# Patient Record
Sex: MALE | Race: WHITE | Employment: OTHER | URBAN - METROPOLITAN AREA
[De-identification: names, ages, dates, MRNs, and addresses within clinical notes are randomized per-mention and may not be internally consistent; named-entity substitution may affect disease eponyms.]

---

## 2024-03-18 ENCOUNTER — HOSPITAL ENCOUNTER (INPATIENT)
Facility: HOSPITAL | Age: 79
LOS: 3 days | Discharge: HOME | End: 2024-03-22
Attending: STUDENT IN AN ORGANIZED HEALTH CARE EDUCATION/TRAINING PROGRAM | Admitting: GENERAL ACUTE CARE HOSPITAL
Payer: MEDICARE

## 2024-03-18 ENCOUNTER — APPOINTMENT (OUTPATIENT)
Facility: HOSPITAL | Age: 79
End: 2024-03-18
Payer: MEDICARE

## 2024-03-18 DIAGNOSIS — R55 SYNCOPE AND COLLAPSE: Primary | ICD-10-CM

## 2024-03-18 DIAGNOSIS — E87.5 HYPERKALEMIA: ICD-10-CM

## 2024-03-18 DIAGNOSIS — D64.9 ANEMIA, UNSPECIFIED TYPE: ICD-10-CM

## 2024-03-18 DIAGNOSIS — R90.89 ABNORMAL BRAIN CT: ICD-10-CM

## 2024-03-18 DIAGNOSIS — R73.9 HYPERGLYCEMIA: ICD-10-CM

## 2024-03-18 LAB
ALBUMIN SERPL-MCNC: 2.7 G/DL (ref 3.5–5)
ALBUMIN/GLOB SERPL: 0.8 (ref 1.1–2.2)
ALP SERPL-CCNC: 422 U/L (ref 45–117)
ALT SERPL-CCNC: 80 U/L (ref 12–78)
ANION GAP BLD CALC-SCNC: 7.5 MMOL/L (ref 10–20)
ANION GAP SERPL CALC-SCNC: 6 MMOL/L (ref 5–15)
APPEARANCE UR: CLEAR
APTT PPP: 24.5 SEC (ref 22.1–31)
AST SERPL-CCNC: 49 U/L (ref 15–37)
BACTERIA URNS QL MICRO: NEGATIVE /HPF
BASE DEFICIT BLDV-SCNC: 6.5 MMOL/L
BASOPHILS # BLD: 0 K/UL (ref 0–0.1)
BASOPHILS NFR BLD: 0 % (ref 0–1)
BILIRUB SERPL-MCNC: 1.2 MG/DL (ref 0.2–1)
BILIRUB UR QL: NEGATIVE
BUN SERPL-MCNC: 33 MG/DL (ref 6–20)
BUN/CREAT SERPL: 21 (ref 12–20)
CA-I BLD-MCNC: 1.31 MMOL/L (ref 1.12–1.32)
CALCIUM SERPL-MCNC: 8.5 MG/DL (ref 8.5–10.1)
CHLORIDE BLD-SCNC: 108 MMOL/L (ref 98–107)
CHLORIDE SERPL-SCNC: 105 MMOL/L (ref 97–108)
CO2 BLD-SCNC: 18.5 MMOL/L (ref 21–32)
CO2 SERPL-SCNC: 20 MMOL/L (ref 21–32)
COLOR UR: ABNORMAL
CREAT BLD-MCNC: 1.01 MG/DL (ref 0.6–1.3)
CREAT SERPL-MCNC: 1.54 MG/DL (ref 0.7–1.3)
DIFFERENTIAL METHOD BLD: ABNORMAL
EOSINOPHIL # BLD: 0 K/UL (ref 0–0.4)
EOSINOPHIL NFR BLD: 0 % (ref 0–7)
EPITH CASTS URNS QL MICRO: ABNORMAL /LPF
ERYTHROCYTE [DISTWIDTH] IN BLOOD BY AUTOMATED COUNT: 16 % (ref 11.5–14.5)
GLOBULIN SER CALC-MCNC: 3.6 G/DL (ref 2–4)
GLUCOSE BLD STRIP.AUTO-MCNC: 334 MG/DL (ref 65–117)
GLUCOSE BLD STRIP.AUTO-MCNC: 575 MG/DL (ref 65–117)
GLUCOSE BLD STRIP.AUTO-MCNC: 586 MG/DL (ref 65–117)
GLUCOSE BLD STRIP.AUTO-MCNC: 599 MG/DL (ref 65–117)
GLUCOSE BLD-MCNC: 446 MG/DL (ref 65–100)
GLUCOSE SERPL-MCNC: 563 MG/DL (ref 65–100)
GLUCOSE UR STRIP.AUTO-MCNC: >1000 MG/DL
HCO3 BLDV-SCNC: 18.3 MMOL/L (ref 23–28)
HCT VFR BLD AUTO: 24.2 % (ref 36.6–50.3)
HGB BLD-MCNC: 7.3 G/DL (ref 12.1–17)
HGB UR QL STRIP: NEGATIVE
HYALINE CASTS URNS QL MICRO: ABNORMAL /LPF (ref 0–2)
IMM GRANULOCYTES # BLD AUTO: 0 K/UL (ref 0–0.04)
IMM GRANULOCYTES NFR BLD AUTO: 0 % (ref 0–0.5)
INR PPP: 1.1 (ref 0.9–1.1)
KETONES UR QL STRIP.AUTO: NEGATIVE MG/DL
LEUKOCYTE ESTERASE UR QL STRIP.AUTO: NEGATIVE
LYMPHOCYTES # BLD: 0.4 K/UL (ref 0.8–3.5)
LYMPHOCYTES NFR BLD: 3 % (ref 12–49)
MCH RBC QN AUTO: 30.7 PG (ref 26–34)
MCHC RBC AUTO-ENTMCNC: 30.2 G/DL (ref 30–36.5)
MCV RBC AUTO: 101.7 FL (ref 80–99)
MONOCYTES # BLD: 0.4 K/UL (ref 0–1)
MONOCYTES NFR BLD: 3 % (ref 5–13)
NEUTS BAND NFR BLD MANUAL: 1 %
NEUTS SEG # BLD: 11 K/UL (ref 1.8–8)
NEUTS SEG NFR BLD: 93 % (ref 32–75)
NITRITE UR QL STRIP.AUTO: NEGATIVE
NRBC # BLD: 0 K/UL (ref 0–0.01)
NRBC BLD-RTO: 0 PER 100 WBC
PCO2 BLDV: 32.9 MMHG (ref 41–51)
PH BLDV: 7.35 (ref 7.32–7.42)
PH UR STRIP: 6.5 (ref 5–8)
PLATELET # BLD AUTO: 255 K/UL (ref 150–400)
PMV BLD AUTO: 10.9 FL (ref 8.9–12.9)
PO2 BLDV: 35 MMHG (ref 25–40)
POTASSIUM BLD-SCNC: 5 MMOL/L (ref 3.5–5.1)
POTASSIUM SERPL-SCNC: 6.2 MMOL/L (ref 3.5–5.1)
PROT SERPL-MCNC: 6.3 G/DL (ref 6.4–8.2)
PROT UR STRIP-MCNC: NEGATIVE MG/DL
PROTHROMBIN TIME: 11 SEC (ref 9–11.1)
RBC # BLD AUTO: 2.38 M/UL (ref 4.1–5.7)
RBC #/AREA URNS HPF: ABNORMAL /HPF (ref 0–5)
RBC MORPH BLD: ABNORMAL
SAO2 % BLDV: 64.7 % (ref 65–88)
SERVICE CMNT-IMP: ABNORMAL
SODIUM BLD-SCNC: 134 MMOL/L (ref 136–145)
SODIUM SERPL-SCNC: 131 MMOL/L (ref 136–145)
SP GR UR REFRACTOMETRY: 1.02
SPECIMEN TYPE: ABNORMAL
THERAPEUTIC RANGE: NORMAL SECS (ref 58–77)
TROPONIN I SERPL HS-MCNC: 10 NG/L (ref 0–76)
TROPONIN I SERPL HS-MCNC: 10 NG/L (ref 0–76)
URINE CULTURE IF INDICATED: ABNORMAL
UROBILINOGEN UR QL STRIP.AUTO: 0.2 EU/DL (ref 0.2–1)
WBC # BLD AUTO: 11.8 K/UL (ref 4.1–11.1)
WBC URNS QL MICRO: ABNORMAL /HPF (ref 0–4)

## 2024-03-18 PROCEDURE — 93005 ELECTROCARDIOGRAM TRACING: CPT | Performed by: STUDENT IN AN ORGANIZED HEALTH CARE EDUCATION/TRAINING PROGRAM

## 2024-03-18 PROCEDURE — 81001 URINALYSIS AUTO W/SCOPE: CPT

## 2024-03-18 PROCEDURE — 36415 COLL VENOUS BLD VENIPUNCTURE: CPT

## 2024-03-18 PROCEDURE — 70450 CT HEAD/BRAIN W/O DYE: CPT

## 2024-03-18 PROCEDURE — 2500000003 HC RX 250 WO HCPCS: Performed by: STUDENT IN AN ORGANIZED HEALTH CARE EDUCATION/TRAINING PROGRAM

## 2024-03-18 PROCEDURE — 71045 X-RAY EXAM CHEST 1 VIEW: CPT

## 2024-03-18 PROCEDURE — 96376 TX/PRO/DX INJ SAME DRUG ADON: CPT

## 2024-03-18 PROCEDURE — 86900 BLOOD TYPING SEROLOGIC ABO: CPT

## 2024-03-18 PROCEDURE — 6370000000 HC RX 637 (ALT 250 FOR IP): Performed by: STUDENT IN AN ORGANIZED HEALTH CARE EDUCATION/TRAINING PROGRAM

## 2024-03-18 PROCEDURE — 84484 ASSAY OF TROPONIN QUANT: CPT

## 2024-03-18 PROCEDURE — 2580000003 HC RX 258: Performed by: STUDENT IN AN ORGANIZED HEALTH CARE EDUCATION/TRAINING PROGRAM

## 2024-03-18 PROCEDURE — 83735 ASSAY OF MAGNESIUM: CPT

## 2024-03-18 PROCEDURE — 82962 GLUCOSE BLOOD TEST: CPT

## 2024-03-18 PROCEDURE — 82803 BLOOD GASES ANY COMBINATION: CPT

## 2024-03-18 PROCEDURE — 86850 RBC ANTIBODY SCREEN: CPT

## 2024-03-18 PROCEDURE — 80053 COMPREHEN METABOLIC PANEL: CPT

## 2024-03-18 PROCEDURE — 96374 THER/PROPH/DIAG INJ IV PUSH: CPT

## 2024-03-18 PROCEDURE — 85025 COMPLETE CBC W/AUTO DIFF WBC: CPT

## 2024-03-18 PROCEDURE — 83036 HEMOGLOBIN GLYCOSYLATED A1C: CPT

## 2024-03-18 PROCEDURE — 84100 ASSAY OF PHOSPHORUS: CPT

## 2024-03-18 PROCEDURE — 85610 PROTHROMBIN TIME: CPT

## 2024-03-18 PROCEDURE — 86901 BLOOD TYPING SEROLOGIC RH(D): CPT

## 2024-03-18 PROCEDURE — 85730 THROMBOPLASTIN TIME PARTIAL: CPT

## 2024-03-18 PROCEDURE — 96365 THER/PROPH/DIAG IV INF INIT: CPT

## 2024-03-18 PROCEDURE — 99285 EMERGENCY DEPT VISIT HI MDM: CPT

## 2024-03-18 PROCEDURE — 80047 BASIC METABLC PNL IONIZED CA: CPT

## 2024-03-18 RX ORDER — LIDOCAINE 40 MG/G
CREAM TOPICAL PRN
Status: DISCONTINUED | OUTPATIENT
Start: 2024-03-18 | End: 2024-03-22 | Stop reason: HOSPADM

## 2024-03-18 RX ORDER — ACETAMINOPHEN 500 MG
1000 TABLET ORAL
Status: COMPLETED | OUTPATIENT
Start: 2024-03-18 | End: 2024-03-18

## 2024-03-18 RX ORDER — POTASSIUM CHLORIDE 7.45 MG/ML
10 INJECTION INTRAVENOUS PRN
Status: DISCONTINUED | OUTPATIENT
Start: 2024-03-18 | End: 2024-03-22 | Stop reason: HOSPADM

## 2024-03-18 RX ORDER — 0.9 % SODIUM CHLORIDE 0.9 %
1000 INTRAVENOUS SOLUTION INTRAVENOUS ONCE
Status: COMPLETED | OUTPATIENT
Start: 2024-03-18 | End: 2024-03-18

## 2024-03-18 RX ORDER — ACETAMINOPHEN 325 MG/1
650 TABLET ORAL EVERY 6 HOURS PRN
Status: DISCONTINUED | OUTPATIENT
Start: 2024-03-18 | End: 2024-03-20

## 2024-03-18 RX ORDER — SODIUM CHLORIDE 9 MG/ML
INJECTION, SOLUTION INTRAVENOUS CONTINUOUS
Status: DISCONTINUED | OUTPATIENT
Start: 2024-03-18 | End: 2024-03-21

## 2024-03-18 RX ORDER — 0.9 % SODIUM CHLORIDE 0.9 %
15 INTRAVENOUS SOLUTION INTRAVENOUS ONCE
Status: COMPLETED | OUTPATIENT
Start: 2024-03-18 | End: 2024-03-18

## 2024-03-18 RX ORDER — ONDANSETRON 2 MG/ML
4 INJECTION INTRAMUSCULAR; INTRAVENOUS EVERY 6 HOURS PRN
Status: DISCONTINUED | OUTPATIENT
Start: 2024-03-18 | End: 2024-03-20

## 2024-03-18 RX ORDER — MAGNESIUM SULFATE IN WATER 40 MG/ML
2000 INJECTION, SOLUTION INTRAVENOUS PRN
Status: DISCONTINUED | OUTPATIENT
Start: 2024-03-18 | End: 2024-03-20

## 2024-03-18 RX ORDER — DEXTROSE AND SODIUM CHLORIDE 5; .45 G/100ML; G/100ML
INJECTION, SOLUTION INTRAVENOUS CONTINUOUS PRN
Status: DISCONTINUED | OUTPATIENT
Start: 2024-03-18 | End: 2024-03-22 | Stop reason: HOSPADM

## 2024-03-18 RX ORDER — CALCIUM GLUCONATE 20 MG/ML
1000 INJECTION, SOLUTION INTRAVENOUS
Status: COMPLETED | OUTPATIENT
Start: 2024-03-18 | End: 2024-03-18

## 2024-03-18 RX ORDER — ASPIRIN 81 MG/1
324 TABLET, CHEWABLE ORAL ONCE
Status: DISCONTINUED | OUTPATIENT
Start: 2024-03-18 | End: 2024-03-18

## 2024-03-18 RX ADMIN — SODIUM CHLORIDE: 9 INJECTION, SOLUTION INTRAVENOUS at 22:24

## 2024-03-18 RX ADMIN — SODIUM CHLORIDE 1308 ML: 9 INJECTION, SOLUTION INTRAVENOUS at 20:14

## 2024-03-18 RX ADMIN — INSULIN HUMAN 10 UNITS: 100 INJECTION, SOLUTION PARENTERAL at 20:10

## 2024-03-18 RX ADMIN — CALCIUM GLUCONATE 1000 MG: 20 INJECTION, SOLUTION INTRAVENOUS at 20:55

## 2024-03-18 RX ADMIN — SODIUM CHLORIDE 1000 ML: 9 INJECTION, SOLUTION INTRAVENOUS at 18:07

## 2024-03-18 RX ADMIN — LIDOCAINE: 40 CREAM TOPICAL at 23:19

## 2024-03-18 RX ADMIN — ACETAMINOPHEN 1000 MG: 500 TABLET ORAL at 20:09

## 2024-03-18 RX ADMIN — SODIUM CHLORIDE 10.3 UNITS/HR: 9 INJECTION, SOLUTION INTRAVENOUS at 21:09

## 2024-03-18 ASSESSMENT — PAIN SCALES - GENERAL
PAINLEVEL_OUTOF10: 2
PAINLEVEL_OUTOF10: 2
PAINLEVEL_OUTOF10: 5

## 2024-03-18 ASSESSMENT — PAIN DESCRIPTION - FREQUENCY: FREQUENCY: CONTINUOUS

## 2024-03-18 ASSESSMENT — PAIN - FUNCTIONAL ASSESSMENT
PAIN_FUNCTIONAL_ASSESSMENT: 0-10
PAIN_FUNCTIONAL_ASSESSMENT: ACTIVITIES ARE NOT PREVENTED

## 2024-03-18 ASSESSMENT — PAIN DESCRIPTION - DESCRIPTORS
DESCRIPTORS: ACHING;THROBBING
DESCRIPTORS: ACHING

## 2024-03-18 ASSESSMENT — PAIN DESCRIPTION - LOCATION
LOCATION: HEAD

## 2024-03-18 ASSESSMENT — PAIN DESCRIPTION - PAIN TYPE: TYPE: ACUTE PAIN

## 2024-03-18 ASSESSMENT — LIFESTYLE VARIABLES
HOW MANY STANDARD DRINKS CONTAINING ALCOHOL DO YOU HAVE ON A TYPICAL DAY: PATIENT DOES NOT DRINK
HOW OFTEN DO YOU HAVE A DRINK CONTAINING ALCOHOL: NEVER

## 2024-03-19 ENCOUNTER — ANESTHESIA EVENT (OUTPATIENT)
Facility: HOSPITAL | Age: 79
End: 2024-03-19
Payer: MEDICARE

## 2024-03-19 ENCOUNTER — APPOINTMENT (OUTPATIENT)
Facility: HOSPITAL | Age: 79
End: 2024-03-19
Payer: MEDICARE

## 2024-03-19 ENCOUNTER — APPOINTMENT (OUTPATIENT)
Facility: HOSPITAL | Age: 79
End: 2024-03-19
Attending: GENERAL ACUTE CARE HOSPITAL
Payer: MEDICARE

## 2024-03-19 PROBLEM — E11.65 UNCONTROLLED TYPE 2 DIABETES MELLITUS WITH HYPERGLYCEMIA (HCC): Status: ACTIVE | Noted: 2024-03-19

## 2024-03-19 PROBLEM — R73.09 HEMOGLOBIN A1C GREATER THAN 9%, INDICATING POOR DIABETIC CONTROL: Status: ACTIVE | Noted: 2024-03-19

## 2024-03-19 PROBLEM — R73.9 HYPERGLYCEMIA: Status: ACTIVE | Noted: 2024-03-19

## 2024-03-19 PROBLEM — R55 SYNCOPE AND COLLAPSE: Status: ACTIVE | Noted: 2024-03-19

## 2024-03-19 LAB
ABO + RH BLD: NORMAL
ALBUMIN SERPL-MCNC: 2.5 G/DL (ref 3.5–5)
ALBUMIN/GLOB SERPL: 0.8 (ref 1.1–2.2)
ALP SERPL-CCNC: 367 U/L (ref 45–117)
ALT SERPL-CCNC: 68 U/L (ref 12–78)
ANION GAP SERPL CALC-SCNC: 3 MMOL/L (ref 5–15)
ANION GAP SERPL CALC-SCNC: 4 MMOL/L (ref 5–15)
ANION GAP SERPL CALC-SCNC: 5 MMOL/L (ref 5–15)
AST SERPL-CCNC: 42 U/L (ref 15–37)
B-OH-BUTYR SERPL-SCNC: 0.09 MMOL/L
BILIRUB DIRECT SERPL-MCNC: 0.5 MG/DL (ref 0–0.2)
BILIRUB SERPL-MCNC: 0.8 MG/DL (ref 0.2–1)
BLOOD GROUP ANTIBODIES SERPL: NORMAL
BUN SERPL-MCNC: 20 MG/DL (ref 6–20)
BUN SERPL-MCNC: 21 MG/DL (ref 6–20)
BUN SERPL-MCNC: 24 MG/DL (ref 6–20)
BUN SERPL-MCNC: 27 MG/DL (ref 6–20)
BUN SERPL-MCNC: 29 MG/DL (ref 6–20)
BUN/CREAT SERPL: 17 (ref 12–20)
BUN/CREAT SERPL: 18 (ref 12–20)
BUN/CREAT SERPL: 21 (ref 12–20)
BUN/CREAT SERPL: 21 (ref 12–20)
BUN/CREAT SERPL: 22 (ref 12–20)
CALCIUM SERPL-MCNC: 8.3 MG/DL (ref 8.5–10.1)
CALCIUM SERPL-MCNC: 8.5 MG/DL (ref 8.5–10.1)
CALCIUM SERPL-MCNC: 8.5 MG/DL (ref 8.5–10.1)
CALCIUM SERPL-MCNC: 8.6 MG/DL (ref 8.5–10.1)
CALCIUM SERPL-MCNC: 8.7 MG/DL (ref 8.5–10.1)
CHLORIDE SERPL-SCNC: 107 MMOL/L (ref 97–108)
CHLORIDE SERPL-SCNC: 108 MMOL/L (ref 97–108)
CHLORIDE SERPL-SCNC: 110 MMOL/L (ref 97–108)
CHLORIDE SERPL-SCNC: 110 MMOL/L (ref 97–108)
CHLORIDE SERPL-SCNC: 112 MMOL/L (ref 97–108)
CO2 SERPL-SCNC: 19 MMOL/L (ref 21–32)
CO2 SERPL-SCNC: 19 MMOL/L (ref 21–32)
CO2 SERPL-SCNC: 21 MMOL/L (ref 21–32)
CO2 SERPL-SCNC: 22 MMOL/L (ref 21–32)
CO2 SERPL-SCNC: 22 MMOL/L (ref 21–32)
CREAT SERPL-MCNC: 1.11 MG/DL (ref 0.7–1.3)
CREAT SERPL-MCNC: 1.14 MG/DL (ref 0.7–1.3)
CREAT SERPL-MCNC: 1.2 MG/DL (ref 0.7–1.3)
CREAT SERPL-MCNC: 1.26 MG/DL (ref 0.7–1.3)
CREAT SERPL-MCNC: 1.41 MG/DL (ref 0.7–1.3)
ECHO BSA: 2.1 M2
EKG ATRIAL RATE: 74 BPM
EKG ATRIAL RATE: 77 BPM
EKG DIAGNOSIS: NORMAL
EKG DIAGNOSIS: NORMAL
EKG P AXIS: 41 DEGREES
EKG P AXIS: 45 DEGREES
EKG P-R INTERVAL: 178 MS
EKG P-R INTERVAL: 190 MS
EKG Q-T INTERVAL: 430 MS
EKG Q-T INTERVAL: 442 MS
EKG QRS DURATION: 124 MS
EKG QRS DURATION: 132 MS
EKG QTC CALCULATION (BAZETT): 477 MS
EKG QTC CALCULATION (BAZETT): 500 MS
EKG R AXIS: -59 DEGREES
EKG R AXIS: -65 DEGREES
EKG T AXIS: 132 DEGREES
EKG T AXIS: 83 DEGREES
EKG VENTRICULAR RATE: 74 BPM
EKG VENTRICULAR RATE: 77 BPM
ERYTHROCYTE [DISTWIDTH] IN BLOOD BY AUTOMATED COUNT: 16 % (ref 11.5–14.5)
ERYTHROCYTE [DISTWIDTH] IN BLOOD BY AUTOMATED COUNT: 16.1 % (ref 11.5–14.5)
EST. AVERAGE GLUCOSE BLD GHB EST-MCNC: 235 MG/DL
FERRITIN SERPL-MCNC: 20 NG/ML (ref 26–388)
FOLATE SERPL-MCNC: 26.2 NG/ML (ref 5–21)
GLOBULIN SER CALC-MCNC: 3.1 G/DL (ref 2–4)
GLUCOSE BLD STRIP.AUTO-MCNC: 136 MG/DL (ref 65–117)
GLUCOSE BLD STRIP.AUTO-MCNC: 140 MG/DL (ref 65–117)
GLUCOSE BLD STRIP.AUTO-MCNC: 141 MG/DL (ref 65–117)
GLUCOSE BLD STRIP.AUTO-MCNC: 145 MG/DL (ref 65–117)
GLUCOSE BLD STRIP.AUTO-MCNC: 149 MG/DL (ref 65–117)
GLUCOSE BLD STRIP.AUTO-MCNC: 152 MG/DL (ref 65–117)
GLUCOSE BLD STRIP.AUTO-MCNC: 157 MG/DL (ref 65–117)
GLUCOSE BLD STRIP.AUTO-MCNC: 168 MG/DL (ref 65–117)
GLUCOSE BLD STRIP.AUTO-MCNC: 198 MG/DL (ref 65–117)
GLUCOSE BLD STRIP.AUTO-MCNC: 224 MG/DL (ref 65–117)
GLUCOSE BLD STRIP.AUTO-MCNC: 234 MG/DL (ref 65–117)
GLUCOSE BLD STRIP.AUTO-MCNC: 255 MG/DL (ref 65–117)
GLUCOSE BLD STRIP.AUTO-MCNC: 310 MG/DL (ref 65–117)
GLUCOSE SERPL-MCNC: 129 MG/DL (ref 65–100)
GLUCOSE SERPL-MCNC: 173 MG/DL (ref 65–100)
GLUCOSE SERPL-MCNC: 188 MG/DL (ref 65–100)
GLUCOSE SERPL-MCNC: 244 MG/DL (ref 65–100)
GLUCOSE SERPL-MCNC: 312 MG/DL (ref 65–100)
HBA1C MFR BLD: 9.8 % (ref 4–5.6)
HCT VFR BLD AUTO: 23 % (ref 36.6–50.3)
HCT VFR BLD AUTO: 23.7 % (ref 36.6–50.3)
HGB BLD-MCNC: 7.1 G/DL (ref 12.1–17)
HGB BLD-MCNC: 7.1 G/DL (ref 12.1–17)
IRON SATN MFR SERPL: 7 % (ref 20–50)
IRON SERPL-MCNC: 28 UG/DL (ref 35–150)
LACTATE SERPL-SCNC: 1.5 MMOL/L (ref 0.4–2)
MAGNESIUM SERPL-MCNC: 2 MG/DL (ref 1.6–2.4)
MAGNESIUM SERPL-MCNC: 2.1 MG/DL (ref 1.6–2.4)
MAGNESIUM SERPL-MCNC: 2.2 MG/DL (ref 1.6–2.4)
MCH RBC QN AUTO: 30.2 PG (ref 26–34)
MCH RBC QN AUTO: 30.7 PG (ref 26–34)
MCHC RBC AUTO-ENTMCNC: 30 G/DL (ref 30–36.5)
MCHC RBC AUTO-ENTMCNC: 30.9 G/DL (ref 30–36.5)
MCV RBC AUTO: 100.9 FL (ref 80–99)
MCV RBC AUTO: 99.6 FL (ref 80–99)
NRBC # BLD: 0 K/UL (ref 0–0.01)
NRBC # BLD: 0 K/UL (ref 0–0.01)
NRBC BLD-RTO: 0 PER 100 WBC
NRBC BLD-RTO: 0 PER 100 WBC
PHOSPHATE SERPL-MCNC: 2.8 MG/DL (ref 2.6–4.7)
PHOSPHATE SERPL-MCNC: 2.9 MG/DL (ref 2.6–4.7)
PHOSPHATE SERPL-MCNC: 2.9 MG/DL (ref 2.6–4.7)
PHOSPHATE SERPL-MCNC: 3 MG/DL (ref 2.6–4.7)
PHOSPHATE SERPL-MCNC: 3.1 MG/DL (ref 2.6–4.7)
PLATELET # BLD AUTO: 224 K/UL (ref 150–400)
PLATELET # BLD AUTO: 226 K/UL (ref 150–400)
PMV BLD AUTO: 10.5 FL (ref 8.9–12.9)
PMV BLD AUTO: 10.9 FL (ref 8.9–12.9)
POTASSIUM SERPL-SCNC: 3.9 MMOL/L (ref 3.5–5.1)
POTASSIUM SERPL-SCNC: 4.2 MMOL/L (ref 3.5–5.1)
POTASSIUM SERPL-SCNC: 4.3 MMOL/L (ref 3.5–5.1)
POTASSIUM SERPL-SCNC: 4.5 MMOL/L (ref 3.5–5.1)
POTASSIUM SERPL-SCNC: 4.6 MMOL/L (ref 3.5–5.1)
PROT SERPL-MCNC: 5.6 G/DL (ref 6.4–8.2)
RBC # BLD AUTO: 2.31 M/UL (ref 4.1–5.7)
RBC # BLD AUTO: 2.35 M/UL (ref 4.1–5.7)
SERVICE CMNT-IMP: ABNORMAL
SODIUM SERPL-SCNC: 133 MMOL/L (ref 136–145)
SODIUM SERPL-SCNC: 134 MMOL/L (ref 136–145)
SODIUM SERPL-SCNC: 135 MMOL/L (ref 136–145)
SPECIMEN EXP DATE BLD: NORMAL
TIBC SERPL-MCNC: 374 UG/DL (ref 250–450)
TROPONIN I SERPL HS-MCNC: 26 NG/L (ref 0–76)
VIT B12 SERPL-MCNC: 859 PG/ML (ref 193–986)
WBC # BLD AUTO: 10.6 K/UL (ref 4.1–11.1)
WBC # BLD AUTO: 10.9 K/UL (ref 4.1–11.1)

## 2024-03-19 PROCEDURE — 82962 GLUCOSE BLOOD TEST: CPT

## 2024-03-19 PROCEDURE — 82728 ASSAY OF FERRITIN: CPT

## 2024-03-19 PROCEDURE — 70551 MRI BRAIN STEM W/O DYE: CPT

## 2024-03-19 PROCEDURE — 80048 BASIC METABOLIC PNL TOTAL CA: CPT

## 2024-03-19 PROCEDURE — 83605 ASSAY OF LACTIC ACID: CPT

## 2024-03-19 PROCEDURE — 6370000000 HC RX 637 (ALT 250 FOR IP): Performed by: STUDENT IN AN ORGANIZED HEALTH CARE EDUCATION/TRAINING PROGRAM

## 2024-03-19 PROCEDURE — 2060000000 HC ICU INTERMEDIATE R&B

## 2024-03-19 PROCEDURE — 84100 ASSAY OF PHOSPHORUS: CPT

## 2024-03-19 PROCEDURE — 83735 ASSAY OF MAGNESIUM: CPT

## 2024-03-19 PROCEDURE — 6370000000 HC RX 637 (ALT 250 FOR IP): Performed by: GENERAL ACUTE CARE HOSPITAL

## 2024-03-19 PROCEDURE — 93306 TTE W/DOPPLER COMPLETE: CPT

## 2024-03-19 PROCEDURE — 70450 CT HEAD/BRAIN W/O DYE: CPT

## 2024-03-19 PROCEDURE — 83550 IRON BINDING TEST: CPT

## 2024-03-19 PROCEDURE — 82746 ASSAY OF FOLIC ACID SERUM: CPT

## 2024-03-19 PROCEDURE — 82607 VITAMIN B-12: CPT

## 2024-03-19 PROCEDURE — 85027 COMPLETE CBC AUTOMATED: CPT

## 2024-03-19 PROCEDURE — 84484 ASSAY OF TROPONIN QUANT: CPT

## 2024-03-19 PROCEDURE — 2580000003 HC RX 258: Performed by: STUDENT IN AN ORGANIZED HEALTH CARE EDUCATION/TRAINING PROGRAM

## 2024-03-19 PROCEDURE — 36415 COLL VENOUS BLD VENIPUNCTURE: CPT

## 2024-03-19 PROCEDURE — 99221 1ST HOSP IP/OBS SF/LOW 40: CPT

## 2024-03-19 PROCEDURE — 82010 KETONE BODYS QUAN: CPT

## 2024-03-19 PROCEDURE — 6370000000 HC RX 637 (ALT 250 FOR IP)

## 2024-03-19 PROCEDURE — 80076 HEPATIC FUNCTION PANEL: CPT

## 2024-03-19 PROCEDURE — 83540 ASSAY OF IRON: CPT

## 2024-03-19 PROCEDURE — 2580000003 HC RX 258: Performed by: GENERAL ACUTE CARE HOSPITAL

## 2024-03-19 RX ORDER — MAGNESIUM SULFATE IN WATER 40 MG/ML
2000 INJECTION, SOLUTION INTRAVENOUS PRN
Status: DISCONTINUED | OUTPATIENT
Start: 2024-03-19 | End: 2024-03-22 | Stop reason: HOSPADM

## 2024-03-19 RX ORDER — SODIUM CHLORIDE 0.9 % (FLUSH) 0.9 %
5-40 SYRINGE (ML) INJECTION EVERY 12 HOURS SCHEDULED
Status: CANCELLED | OUTPATIENT
Start: 2024-03-19

## 2024-03-19 RX ORDER — NITROGLYCERIN 0.4 MG/1
0.4 TABLET SUBLINGUAL EVERY 5 MIN PRN
COMMUNITY

## 2024-03-19 RX ORDER — SODIUM CHLORIDE 9 MG/ML
25 INJECTION, SOLUTION INTRAVENOUS PRN
Status: CANCELLED | OUTPATIENT
Start: 2024-03-19

## 2024-03-19 RX ORDER — ONDANSETRON 4 MG/1
4 TABLET, ORALLY DISINTEGRATING ORAL EVERY 8 HOURS PRN
Status: DISCONTINUED | OUTPATIENT
Start: 2024-03-19 | End: 2024-03-22 | Stop reason: HOSPADM

## 2024-03-19 RX ORDER — MIDODRINE HYDROCHLORIDE 10 MG/1
15 TABLET ORAL 3 TIMES DAILY
Status: ON HOLD | COMMUNITY
End: 2024-03-22

## 2024-03-19 RX ORDER — SODIUM CHLORIDE 9 MG/ML
INJECTION, SOLUTION INTRAVENOUS PRN
Status: DISCONTINUED | OUTPATIENT
Start: 2024-03-19 | End: 2024-03-22 | Stop reason: HOSPADM

## 2024-03-19 RX ORDER — LORAZEPAM 1 MG/1
1 TABLET ORAL PRN
Status: DISCONTINUED | OUTPATIENT
Start: 2024-03-19 | End: 2024-03-19

## 2024-03-19 RX ORDER — ROSUVASTATIN CALCIUM 20 MG/1
20 TABLET, COATED ORAL DAILY
COMMUNITY

## 2024-03-19 RX ORDER — PANTOPRAZOLE SODIUM 40 MG/1
40 TABLET, DELAYED RELEASE ORAL DAILY
COMMUNITY

## 2024-03-19 RX ORDER — ACETAMINOPHEN 325 MG/1
650 TABLET ORAL EVERY 6 HOURS PRN
Status: DISCONTINUED | OUTPATIENT
Start: 2024-03-19 | End: 2024-03-22 | Stop reason: HOSPADM

## 2024-03-19 RX ORDER — SODIUM CHLORIDE 0.9 % (FLUSH) 0.9 %
5-40 SYRINGE (ML) INJECTION PRN
Status: DISCONTINUED | OUTPATIENT
Start: 2024-03-19 | End: 2024-03-20

## 2024-03-19 RX ORDER — SPIRONOLACTONE 100 MG/1
100 TABLET, FILM COATED ORAL 2 TIMES DAILY
Status: ON HOLD | COMMUNITY
End: 2024-03-22 | Stop reason: HOSPADM

## 2024-03-19 RX ORDER — PREDNISONE 20 MG/1
20 TABLET ORAL DAILY
COMMUNITY

## 2024-03-19 RX ORDER — POLYETHYLENE GLYCOL 3350 17 G/17G
17 POWDER, FOR SOLUTION ORAL DAILY PRN
Status: DISCONTINUED | OUTPATIENT
Start: 2024-03-19 | End: 2024-03-22 | Stop reason: HOSPADM

## 2024-03-19 RX ORDER — NADOLOL 20 MG/1
20 TABLET ORAL DAILY
COMMUNITY

## 2024-03-19 RX ORDER — MIDODRINE HYDROCHLORIDE 5 MG/1
10 TABLET ORAL
Status: DISCONTINUED | OUTPATIENT
Start: 2024-03-19 | End: 2024-03-20

## 2024-03-19 RX ORDER — MENTHOL 1.4 %
ADHESIVE PATCH, MEDICATED TOPICAL 3 TIMES DAILY PRN
Status: DISCONTINUED | OUTPATIENT
Start: 2024-03-19 | End: 2024-03-22 | Stop reason: HOSPADM

## 2024-03-19 RX ORDER — ONDANSETRON 2 MG/ML
4 INJECTION INTRAMUSCULAR; INTRAVENOUS EVERY 6 HOURS PRN
Status: DISCONTINUED | OUTPATIENT
Start: 2024-03-19 | End: 2024-03-22 | Stop reason: HOSPADM

## 2024-03-19 RX ORDER — INSULIN LISPRO 100 [IU]/ML
12 INJECTION, SOLUTION INTRAVENOUS; SUBCUTANEOUS
Status: ON HOLD | COMMUNITY
End: 2024-03-22

## 2024-03-19 RX ORDER — SODIUM CHLORIDE 0.9 % (FLUSH) 0.9 %
5-40 SYRINGE (ML) INJECTION PRN
Status: CANCELLED | OUTPATIENT
Start: 2024-03-19

## 2024-03-19 RX ORDER — POTASSIUM CHLORIDE 7.45 MG/ML
10 INJECTION INTRAVENOUS PRN
Status: DISCONTINUED | OUTPATIENT
Start: 2024-03-19 | End: 2024-03-22 | Stop reason: HOSPADM

## 2024-03-19 RX ORDER — LORAZEPAM 0.5 MG/1
0.5 TABLET ORAL PRN
Status: COMPLETED | OUTPATIENT
Start: 2024-03-19 | End: 2024-03-19

## 2024-03-19 RX ORDER — FLUOXETINE HYDROCHLORIDE 20 MG/1
20 CAPSULE ORAL DAILY
COMMUNITY

## 2024-03-19 RX ORDER — ACETAMINOPHEN 650 MG/1
650 SUPPOSITORY RECTAL EVERY 6 HOURS PRN
Status: DISCONTINUED | OUTPATIENT
Start: 2024-03-19 | End: 2024-03-22 | Stop reason: HOSPADM

## 2024-03-19 RX ORDER — FLUOXETINE HYDROCHLORIDE 40 MG/1
40 CAPSULE ORAL DAILY
COMMUNITY

## 2024-03-19 RX ORDER — POTASSIUM CHLORIDE 20 MEQ/1
40 TABLET, EXTENDED RELEASE ORAL PRN
Status: DISCONTINUED | OUTPATIENT
Start: 2024-03-19 | End: 2024-03-22 | Stop reason: HOSPADM

## 2024-03-19 RX ORDER — HYDROXYZINE HYDROCHLORIDE 10 MG/1
10 TABLET, FILM COATED ORAL 2 TIMES DAILY PRN
COMMUNITY

## 2024-03-19 RX ORDER — LOPERAMIDE HYDROCHLORIDE 2 MG/1
2 CAPSULE ORAL 4 TIMES DAILY PRN
COMMUNITY

## 2024-03-19 RX ORDER — SODIUM CHLORIDE 0.9 % (FLUSH) 0.9 %
5-40 SYRINGE (ML) INJECTION EVERY 12 HOURS SCHEDULED
Status: DISCONTINUED | OUTPATIENT
Start: 2024-03-19 | End: 2024-03-22 | Stop reason: HOSPADM

## 2024-03-19 RX ORDER — MERCAPTOPURINE 50 MG/1
25 TABLET ORAL DAILY
COMMUNITY

## 2024-03-19 RX ORDER — TRAZODONE HYDROCHLORIDE 100 MG/1
100 TABLET ORAL NIGHTLY
COMMUNITY

## 2024-03-19 RX ADMIN — SODIUM CHLORIDE, PRESERVATIVE FREE 10 ML: 5 INJECTION INTRAVENOUS at 21:51

## 2024-03-19 RX ADMIN — LORAZEPAM 0.5 MG: 0.5 TABLET ORAL at 15:16

## 2024-03-19 RX ADMIN — MIDODRINE HYDROCHLORIDE 10 MG: 5 TABLET ORAL at 11:47

## 2024-03-19 RX ADMIN — INSULIN HUMAN 20 UNITS: 100 INJECTION, SUSPENSION SUBCUTANEOUS at 21:24

## 2024-03-19 RX ADMIN — ACETAMINOPHEN 650 MG: 325 TABLET ORAL at 10:05

## 2024-03-19 RX ADMIN — DEXTROSE AND SODIUM CHLORIDE: 5; 450 INJECTION, SOLUTION INTRAVENOUS at 03:14

## 2024-03-19 RX ADMIN — MIDODRINE HYDROCHLORIDE 10 MG: 5 TABLET ORAL at 17:19

## 2024-03-19 RX ADMIN — MUSCLE RUB CREAM: 100; 150 CREAM TOPICAL at 14:29

## 2024-03-19 RX ADMIN — ACETAMINOPHEN 650 MG: 325 TABLET ORAL at 20:02

## 2024-03-19 RX ADMIN — SODIUM CHLORIDE, PRESERVATIVE FREE 10 ML: 5 INJECTION INTRAVENOUS at 08:32

## 2024-03-19 RX ADMIN — INSULIN HUMAN 20 UNITS: 100 INJECTION, SUSPENSION SUBCUTANEOUS at 11:48

## 2024-03-19 RX ADMIN — DEXTROSE AND SODIUM CHLORIDE: 5; 450 INJECTION, SOLUTION INTRAVENOUS at 10:08

## 2024-03-19 RX ADMIN — LORAZEPAM 0.5 MG: 0.5 TABLET ORAL at 14:25

## 2024-03-19 ASSESSMENT — PAIN SCALES - GENERAL
PAINLEVEL_OUTOF10: 0
PAINLEVEL_OUTOF10: 4
PAINLEVEL_OUTOF10: 0
PAINLEVEL_OUTOF10: 0
PAINLEVEL_OUTOF10: 5
PAINLEVEL_OUTOF10: 0
PAINLEVEL_OUTOF10: 0
PAINLEVEL_OUTOF10: 3

## 2024-03-19 ASSESSMENT — PAIN DESCRIPTION - DESCRIPTORS
DESCRIPTORS: ACHING

## 2024-03-19 ASSESSMENT — PAIN DESCRIPTION - LOCATION
LOCATION: HEAD

## 2024-03-19 ASSESSMENT — PAIN DESCRIPTION - ORIENTATION
ORIENTATION: ANTERIOR;POSTERIOR
ORIENTATION: POSTERIOR
ORIENTATION: ANTERIOR;POSTERIOR

## 2024-03-19 ASSESSMENT — PAIN DESCRIPTION - PAIN TYPE: TYPE: ACUTE PAIN

## 2024-03-19 NOTE — DIABETES MGMT
[facility]  DIABETES MANAGEMENT CONSULT    Consulted by Provider for advanced nursing evaluation and care for inpatient blood glucose management.    Evaluation and Action Plan   Vadim Sotomayor Jr. Is a 78 year old male patient with uncontrolled Type 2 diabetes. The patient reportedly takes 12 units Humalog with each meal and 50 units Humulin N nightly. The patient reports that he skipped his meal time doses yesterday but took his Humulin N the previous night. His BG's were significantly elevated at admission. The patient was placed on an insulin infusion and was transitioned off this morning with 20 units NPH. His current A1c is 9.8% and I suspect the patient needs an adjustment in his insulin regimen. Diabetes management will monitor BG trends and insulin requirements and make adjustments as needed.       Management Rationale Action Plan   Medication   Basal needs Using 0.25 units/kg/D based on weight  Use 20 units NPH Q 12 hours    Nutritional needs None at this time.   Will add once eating   Corrective insulin Using medium dose  sensitivity based on weight    Additional orders          Diabetes Discharge Plan   Medication  TBD   Referral  [x]        Outpatient diabetes education   Additional orders            Initial Presentation   Vadim Sotomayor Jr. is a 78 y.o. male admitted  after experiencing a syncopal episode at the drug store. The patient reports that he was at the store to purchase OTC medications for neuropathy pain when he passed out.   LAB: Glucose 563. Creatine 1.54. GFR 46. A1c 9.8%  CXR:Narrative & Impression  EXAM: XR CHEST PORTABLE     INDICATION: Syncope     COMPARISON: None.     FINDINGS: A portable AP radiograph of the chest was obtained at 1815 hours.  Right IJ port. Cardiac monitoring leads. Median sternotomy changes.. The lungs  are clear. Mild cardiomegaly..  The bones and soft tissues are grossly within  normal limits.     IMPRESSION:  No acute  cardiopulmonary process.    CT:Narrative & Impression  EXAM: CT HEAD WO CONTRAST     INDICATION: fall, hit head     COMPARISON: None.     CONTRAST: None.     TECHNIQUE: Unenhanced CT of the head was performed using 5 mm images. Brain and  bone windows were generated. Coronal and sagittal reformats. CT dose reduction  was achieved through use of a standardized protocol tailored for this  examination and automatic exposure control for dose modulation.       FINDINGS:  Bifrontal extra-axial high density fluid along the inferior frontal lobes  bilaterally consistent with subdural hemorrhage. No evidence of intraparenchymal  hemorrhage. No calvarial fracture. Fluid in the left maxillary sinus and left  posterior ethmoid air cells. No significant midline shift or mass effect. No  subarachnoid hemorrhage.     IMPRESSION:  1.  Acute bilateral inferior frontal lobe subdural hematomas without significant  edema or mass effect.  2.  No intraparenchymal hemorrhage.  3.  No acute fracture.    HX: History reviewed. No pertinent past medical history.     INITIAL DX: Syncope and collapse [R55]  Hyperkalemia [E87.5]  Hyperglycemia [R73.9]  Abnormal brain CT [R90.89]  Anemia, unspecified type [D64.9]     Current Treatment     TX: NPH. Midodrine     Hospital Course   Clinical progress has been complicated by hyperglycemia.     Diabetes History   The patient reports a 26 year hx of Type 2 diabetes . He follows with an endocrinologist. He resides alone but reports a good support system.     Diabetes-related Medical History  Acute complications  Severe Hyperglycemia  Neurological complications  Peripheral neuropathy  Macrovascular disease  CAD      Diabetes Medication History  Diabetes drug class Diabetes drug name Additional Comments   Insulin Humalog  Humulin N      Diabetes self-management practices:   Eating pattern   [x] Eating a carbohydrate-controlled meal plan    Physical activity pattern Deferred   [] Employing a physical

## 2024-03-19 NOTE — CONSENT
Informed Consent for Blood Component Transfusion Note    I have discussed with the patient the rationale for blood component transfusion; its benefits in treating or preventing fatigue, organ damage, or death; and its risk which includes mild transfusion reactions, rare risk of blood borne infection, or more serious but rare reactions. I have discussed the alternatives to transfusion, including the risk and consequences of not receiving transfusion. The patient had an opportunity to ask questions and had agreed to proceed with transfusion of blood components.    Electronically signed by Jameel Morales MD on 3/18/24 at 10:32 PM EDT

## 2024-03-19 NOTE — CONSULTS
for this consult and allowing me to take part in this patients care.  Please call with questions.          [x]        High complexity decision making was performed      CC / Reason for consult: Syncope, chest pain    History of the presenting illness:  Vadim Sotomayor Jr. is a 78 y.o. male with past medical history of CAD s/p CABG, orthostatic hypotension on midodrine, anemia presented to hospital with syncopal episode when he was standing in [employer].  He reports felt dizzy and within couple seconds he fell on the floor.  He denied any chest pain or chest pressure.  Denied palpitations.  He hit his head and has subdural hematoma.  He does have chest pain with exertion.  He felt some pressure around the head and thought it may be his angina.  Did feel unusual sensation in the chest.  He took nitroglycerin.    He sees Dr. Cevallos with cardiology.  His midodrine was increased to 15 mg 2 weeks ago.  He says he did not take midodrine for 2 days.  He was waiting for new medications to arrive.  He is unsure if he was hydrated.    He has normal troponin.  Noted to have anemia.      Past medical history: Cirrhosis, ulcerative colitis.  JEFFERS.  CAD s/p CABG    Family history: No family history of premature CAD    Social History     Socioeconomic History    Marital status:      Spouse name: Not on file    Number of children: Not on file    Years of education: Not on file    Highest education level: Not on file   Occupational History    Not on file   Tobacco Use    Smoking status: Never    Smokeless tobacco: Never   Substance and Sexual Activity    Alcohol use: Not Currently    Drug use: Never    Sexual activity: Not on file   Other Topics Concern    Not on file   Social History Narrative    Not on file     Social Determinants of Health     Financial Resource Strain: Not on file   Food Insecurity: No Food Insecurity (3/19/2024)    Hunger Vital Sign     Worried About Running Out of Food in the Last Year: Never true     Ran  Out of Food in the Last Year: Never true   Transportation Needs: No Transportation Needs (3/19/2024)    PRAPARE - Transportation     Lack of Transportation (Medical): No     Lack of Transportation (Non-Medical): No   Physical Activity: Not on file   Stress: Not on file   Social Connections: Not on file   Intimate Partner Violence: Not on file   Housing Stability: Low Risk  (3/19/2024)    Housing Stability Vital Sign     Unable to Pay for Housing in the Last Year: No     Number of Places Lived in the Last Year: 1     Unstable Housing in the Last Year: No         Review of Systems        Total of 12 systems reviewed, all systems review was negative except Pertinent Positives included in HPI     /60   Pulse 77   Temp 98.8 °F (37.1 °C) (Oral)   Resp 28   Ht 1.793 m (5' 10.6\")   Wt 88.9 kg (196 lb)   SpO2 94%   BMI 27.65 kg/m²     [unfilled]  Examination:     General: Alert + Oriented x3, no acute distress   HEENT: Normocephalic aromatic, MMM   Neck: Supple, JVP- not well appreciated   RS: Non labored, clear   CVS: Regular rate and rhythm, S1S2, no murmur   Abd: Soft, non tender, non distended   Lower extremity: Warm to touch, Edema- None   Skin: Warm and dry, No significant bruises or rash   CNS: Oriented x3, no focal neuro deficit     Lab review:    Recent Results (from the past 72 hour(s))   POCT Glucose    Collection Time: 03/18/24  5:49 PM   Result Value Ref Range    POC Glucose 586 (H) 65 - 117 mg/dL    Performed by: Abbot Dee SPARKS    EKG 12 Lead    Collection Time: 03/18/24  5:53 PM   Result Value Ref Range    Ventricular Rate 74 BPM    Atrial Rate 74 BPM    P-R Interval 190 ms    QRS Duration 132 ms    Q-T Interval 430 ms    QTc Calculation (Bazett) 477 ms    P Axis 45 degrees    R Axis -65 degrees    T Axis 83 degrees    Diagnosis       Sinus rhythm with premature atrial complexes  Right bundle branch block  Left anterior fascicular block  ** Bifascicular block **  T wave abnormality, consider

## 2024-03-19 NOTE — PROGRESS NOTES
I have discussed with the patient the rationale for blood component transfusion; its benefits in treating or preventing fatigue, organ damage, or death; and its risk which includes mild transfusion reactions, rare risk of blood borne infection, or more serious but rare reactions. I have discussed the alternatives to transfusion, including the risk and consequences of not receiving transfusion. The patient had an opportunity to ask questions and had agreed to proceed with transfusion of blood components.    Eldon Hua MD

## 2024-03-19 NOTE — PROGRESS NOTES
Pharmacy Medication Reconciliation     The patient was interviewed regarding current PTA medication list, use and drug allergies;  patient present in room and obtained permission from patient to discuss drug regimen with visitor(s) present. The patient was questioned regarding use of any other inhalers, topical products, over the counter medications, herbal medications, vitamin products or ophthalmic/nasal/otic medication use.     Allergy Update: Patient has no known allergies.    Recommendations/Findings:   The following amendments were made to the patient's active medication list on file at [facility]:   1) Additions: All below    2) Deletions:     3) Changes:       Pertinent Findings: See above    Clarified PTA med list with patient [facility] and [facility] . PTA medication list was corrected to the following:     Prior to Admission Medications   Prescriptions Last Dose Informant   FLUoxetine (PROZAC) 20 MG capsule     Sig: Take 1 capsule by mouth daily   FLUoxetine (PROZAC) 40 MG capsule     Sig: Take 1 capsule by mouth daily   hydrOXYzine HCl (ATARAX) 10 MG tablet     Sig: Take 1 tablet by mouth 2 times daily as needed for Anxiety   insulin NPH (HUMULIN N;NOVOLIN N) 100 UNIT/ML injection vial     Sig: Inject 50 Units into the skin nightly   insulin lispro (HUMALOG) 100 UNIT/ML SOLN injection vial     Sig: Inject 12 Units into the skin 3 times daily (before meals)   loperamide (IMODIUM) 2 MG capsule     Sig: Take 1 capsule by mouth 4 times daily as needed for Diarrhea   mercaptopurine (PURINETHOL) 50 MG chemo tablet     Sig: Take 0.5 tablets by mouth daily   midodrine (PROAMATINE) 10 MG tablet     Sig: Take 1.5 tablets by mouth 3 times daily   nadolol (CORGARD) 20 MG tablet     Sig: Take 1 tablet by mouth daily   nitroGLYCERIN (NITROSTAT) 0.4 MG SL tablet     Sig: Place 1 tablet under the tongue every 5 minutes as needed for Chest pain up to max of 3 total doses. If no relief after 1 dose, call 911.   pantoprazole (PROTONIX)

## 2024-03-19 NOTE — PROGRESS NOTES
TRANSFER - IN REPORT:    Verbal report received from Destin Sotomayor Jr.  being received from ED for routine progression of patient care      Report consisted of patient's Situation, Background, Assessment and   Recommendations(SBAR).     Information from the following report(s) Nurse Handoff Report, MAR, and Cardiac Rhythm Normal sinus  was reviewed with the receiving nurse.    Opportunity for questions and clarification was provided.      Assessment completed upon patient's arrival to unit and care assumed.

## 2024-03-19 NOTE — ED PROVIDER NOTES
[facility] EMERGENCY DEPT  EMERGENCY DEPARTMENT ENCOUNTER       Pt Name: Vadim Sotomayor Jr.  MRN: [id number]  Birthdate 1945  Date of evaluation: 3/18/2024  Provider: Jameel Morales MD   PCP: Camacho Castro MD  Note Started: 9:34 PM EDT 3/18/24     CHIEF COMPLAINT       Chief Complaint   Patient presents with    Loss of Consciousness     Pt presents to ED via EMS and is ambulatory off stretcher. EMS reports pt had a syncopal episode at [facility] and [facility] team members gave pt 1 or 2 tabs of glucose then took BG which was 485. EMS ; pt started on 1L NS. Pt reports he did hit his head and loss consciousness. Pt c/o headache. Pt denies any SOB, chest pain, and/or N/V/D. Pt denies any blood thinners. H/o T2DM. Pt states he has been out of his BG strips and hasn't been able to check his BG in months.     HISTORY OF PRESENT ILLNESS: 1 or more elements      History From: dannielle, History limited by: none     Vadim Sotomayor Jr. is a 78 y.o. male with past medical history of diabetes and coronary artery disease presents to the emergency department after syncopal episode.  He was at [facility] when he suddenly became slightly dizzy and fell backwards and hit his head.   He denies losing consciousness, but now has a headache.  He does not take any blood thinners.  He reports he takes short acting insulin multiple times per day but has not taken it this morning.  He also uses insulin 70/30 at night.    Please See MDM for Additional Details of the HPI/PMH  Nursing Notes were all reviewed and agreed with or any disagreements were addressed in the HPI.     REVIEW OF SYSTEMS      Positives and Pertinent negatives as per HPI.    PAST HISTORY     Past Medical History:  No past medical history on file.    Past Surgical History:  No past surgical history on file.    Family History:  No family history on file.    Social History:       Allergies:  No Known Allergies    CURRENT MEDICATIONS      Previous Medications    No medications on  patient's condition. Failure to initiate these interventions on an urgent basis would likely result in sudden, clinically significant or life threatening deterioration in the patient's condition.    Abnormal findings supporting critical care: hyperglycemia, hyperkalemia, questionable subdural hematoma  Interventions to support critical care: insulin gtt, IV insulin bolus, IV calcium, neurosurgery consult  Failure to intervene may result in: hyperkalemia, arrhythmia, cardiac arrest, DKA/HHS, death      EMERGENCY DEPARTMENT COURSE and DIFFERENTIAL DIAGNOSIS/MDM   Vitals:    Vitals:    03/18/24 1748 03/18/24 1858 03/18/24 1943   BP: (!) 116/56 127/66 136/71   Pulse: 71 78 77   Resp: 15 22 21   Temp: 97.8 °F (36.6 °C)     TempSrc: Oral     SpO2: 97% 100% 99%   Weight: 87.2 kg (192 lb 3.2 oz)     Height: 1.793 m (5' 10.6\")          Patient was given the following medications:  Medications   dextrose bolus 10% 125 mL (has no administration in time range)     Or   dextrose bolus 10% 250 mL (has no administration in time range)   potassium chloride 10 mEq/100 mL IVPB (Peripheral Line) (has no administration in time range)   magnesium sulfate 2000 mg in 50 mL IVPB premix (has no administration in time range)   sodium phosphate 15 mmol in sodium chloride 0.9 % 250 mL IVPB (has no administration in time range)   sodium chloride 0.9 % bolus 1,308 mL (0 mLs IntraVENous Stopped 3/18/24 2220)     Followed by   0.9 % sodium chloride infusion ( IntraVENous New Bag 3/18/24 2224)   insulin regular (HUMULIN R;NOVOLIN R) injection 10 Units (10 Units IntraVENous Given 3/18/24 2010)     And   insulin regular (HUMULIN R;NOVOLIN R) 100 Units in sodium chloride 0.9 % 100 mL infusion (3.86 Units/hr IntraVENous Rate/Dose Change 3/18/24 2218)   dextrose 5 % and 0.45 % sodium chloride infusion (has no administration in time range)   lidocaine (LMX) 4 % cream (has no administration in time range)   acetaminophen (TYLENOL) tablet 650 mg (has no

## 2024-03-19 NOTE — PROGRESS NOTES
MRI PENDING    Completion of MRI Screening Sheet    Fax to [phone] when completed    Please call [phone]  When this is done    Thank You

## 2024-03-19 NOTE — PROGRESS NOTES
Patient seen and examined at bedside.  Communicated with radiology and neurosurgery with regards to skull fracture.  Stable bilateral subdural hematomas.  MRI brain ordered-small acute infarct in the right inferior cerebellum with associated petechial hemorrhage.  Stable thin acute bilateral frontal convexity dural hematomas without significant mass effect.  Small acute intraparenchymal hemorrhagic contusions of bilateral inferior frontal lobes left greater than right trace acute intraventricular hemorrhage and occipital horns symmetric T1 hyperdensity in the bilateral globus pallidus most commonly seen with chronic liver disease but can be seen in a variety of metabolic disorders.  Clinically correlate.  Neurology consulted   EGD planned for tomorrow  Cards recs noted for possible need for 1m monitoring OP for bradycardia.  Otherwise agree with overnight H&P.  Nonbillable note.

## 2024-03-19 NOTE — PROGRESS NOTES
0245: Patient received from ED this time.    End of Shift Note    Bedside shift change report given to Татьяна XIONG (oncoming nurse) by Krystal Main RN (offgoing nurse).  Report included the following information SBAR, MAR, and Cardiac Rhythm Normal sinus    Shift worked:  7p-7.30a     Shift summary and any significant changes:     Insulin drip running   Q1 ACHS check    Q4 BMP, Ph and Mg check   Patient is on NPO     Concerns for physician to address:       Zone phone for oncoming shift:          Activity:     Number times ambulated in hallways past shift: 0  Number of times OOB to chair past shift: 0    Cardiac:   Cardiac Monitoring: Yes           Access:  Current line(s): PIV     Genitourinary:   Urinary status: voiding    Respiratory:      Chronic home O2 use?: NO  Incentive spirometer at bedside: N/A       GI:     Current diet:  Diet NPO  Passing flatus: YES  Tolerating current diet: YES       Pain Management:   Patient states pain is manageable on current regimen: YES    Skin:     Interventions: float heels, increase time out of bed, limit briefs, and nutritional support    Patient Safety:  Fall Score:    Interventions: bed/chair alarm, assistive device (walker, cane. etc), gripper socks, and pt to call before getting OOB       Length of Stay:  Expected LOS: 3  Actual LOS: 0      Krystal Main RN

## 2024-03-19 NOTE — PROGRESS NOTES
4 Eyes Skin Assessment     NAME:  Vadim Sotomayor Jr.  YOB: 1945  MEDICAL RECORD NUMBER:  [id number]    The patient is being assessed for  Admission    I agree that at least one RN has performed a thorough Head to Toe Skin Assessment on the patient. ALL assessment sites listed below have been assessed.      Areas assessed by both nurses:    Head, Face, Ears, Shoulders, Back, Chest, Arms, Elbows, Hands, Sacrum. Buttock, Coccyx, Ischium, and Legs. Feet and Heels        Does the Patient have a Wound? No noted wound(s)       Drew Prevention initiated by RN: Yes  Wound Care Orders initiated by RN: No    Pressure Injury (Stage 3,4, Unstageable, DTI, NWPT, and Complex wounds) if present, place Wound referral order by RN under : No    New Ostomies, if present place, Ostomy referral order under : No     Nurse 1 eSignature: Electronically signed by Krystal Main RN on 3/19/24 at 3:57 AM EDT    **SHARE this note so that the co-signing nurse can place an eSignature**    Nurse 2 eSignature: Electronically signed by Radha Branham RN on 3/19/24 at 4:49 AM EDT

## 2024-03-19 NOTE — CONSULTS
GI with any further questions or concerns. Thank you!    Plan discussed with Dr. Rios  Subjective:     HISTORY OF PRESENT ILLNESS:     Vadim Sotomayor Jr. is an 78 y.o.  male who we are asked to see for complaint of anemia and syncope. He is well known to our practice and is followed for cirrhosis 2/2 JEFFERS and ulcerative colitis. UC tx includes failed budesonide, mesalamine, remicaide. Has been on 6-MP up until yesterday, but concern for DILI 2/2 to this with plans to switch to entyvio in the near future. MRCP outpatient showed distal CBD stone maybe contributing to elevated ALP. Cirrhosis ascites managed w/ 200 spironolactone and 40 furosemide. Pt states he was at [facility] and had syncopal episode and brought to ED. HgB 2 weeks ago was normal.  Denies CP, SOB. No abdominal pain and has not had any rectal bleeding, increased frequency of bowel movements. Does not drink or smoke. Denies blood thinners or nsaids. Denies any heart burn, reflux, dysphagia since being on PPI.       History reviewed. No pertinent past medical history.   No past surgical history on file.  Social History     Tobacco Use    Smoking status: Never    Smokeless tobacco: Never   Substance Use Topics    Alcohol use: Not Currently      No family history on file.   No Known Allergies   Prior to Admission medications    Not on File       Patient Active Problem List   Diagnosis    Syncope and collapse       REVIEW OF SYSTEMS:    Constitutional: negative fever, negative chills, negative weight loss  Eyes:   negative visual changes  ENT:   negative sore throat, tongue or lip swelling   Respiratory:  negative cough, negative dyspnea  Cards:  negative for chest pain, palpitations, lower extremity edema  GI:   See HPI  :  negative for frequency, dysuria  Integument:  negative for rash and pruritus  Heme:  negative for easy bruising and gum/nose bleeding  Musculoskel: negative for myalgias,  back pain and muscle weakness  Neuro: negative for  headaches, dizziness, vertigo  Psych: negative for feelings of anxiety, depression   Objective:   VITALS:    Vitals:    03/19/24 0245   BP: 130/60   Pulse: 77   Resp: 28   Temp: 98.8 °F (37.1 °C)   SpO2:        PHYSICAL EXAM:   General:          Alert, WD, WN, cooperative, no distress, appears stated age.    Head:               Normocephalic, without obvious abnormality, atraumatic.  Eyes:               Conjunctivae clear and pale, anicteric sclerae.  Pupils are equal  Abdomen:        Soft, non-tender. Mildly distended.  Bowel sounds normal. No masses  Extremities:     Atraumatic, No cyanosis.  No edema. No clubbing  Skin:                Texture, turgor normal. No rashes/lesions/jaundice  Lymph:            Cervical, supraclavicular normal.  Psych:             Good insight.  Not depressed.  Not anxious or agitated.  Neurologic:      EOMs intact. No facial asymmetry. No aphasia or slurred speech. Normal                        strength, A/O X 3.     LAB DATA REVIEWED:    Recent Results (from the past 24 hour(s))   POCT Glucose    Collection Time: 03/18/24  5:49 PM   Result Value Ref Range    POC Glucose 586 (H) 65 - 117 mg/dL    Performed by: Abbot Price EDCHELLE    EKG 12 Lead    Collection Time: 03/18/24  5:53 PM   Result Value Ref Range    Ventricular Rate 74 BPM    Atrial Rate 74 BPM    P-R Interval 190 ms    QRS Duration 132 ms    Q-T Interval 430 ms    QTc Calculation (Bazett) 477 ms    P Axis 45 degrees    R Axis -65 degrees    T Axis 83 degrees    Diagnosis       Sinus rhythm with premature atrial complexes  Right bundle branch block  Left anterior fascicular block  ** Bifascicular block **  T wave abnormality, consider lateral ischemia  When compared with ECG of 14-JAN-2005 14:58,  Previous ECG has undetermined rhythm, needs review  (RBBB and left anterior fascicular block) is now present     CBC with Auto Differential    Collection Time: 03/18/24  6:13 PM   Result Value Ref Range    WBC 11.8 (H) 4.1 - 11.1 K/uL

## 2024-03-19 NOTE — ED NOTES
Upon entering pt's room to start his insulin drip, pt reported having \"angina\", states he took a dose of nitro prior to my arrival.  Pt admonished for taking home medications without notifying staff, EKG performed and given to ER MD.  MD informed of pt's new complaint of angina, new orders for labwork received at this time.

## 2024-03-19 NOTE — CONSULTS
77yo presents to ED after GLF - no external signs of trauma.  Workup found multiple medical abnormalities - hyponatremia, hyperkalemia, elevated BUN, creatinine with EGFR 46, glucose over 500, elevated LFTs,  elevated WBC.  Workup also found small (1-2mm) frontal convexity hyperintensities. Unclear if this is hemorrhage.  There is no mass effect and other no abnormalities on Head CT.  I recommend addressing his medical issues, q 4 hour neuro checks (does not need ICU from neurosurgical standpoint) and repeat CT in am.  If hyperintensities persist, can consider MRI at some point to better clarify.  I will review repeat CT and be available if there are any changes.     English

## 2024-03-19 NOTE — CARE COORDINATION
Care Management Initial Assessment       RUR: 12% (Low RUR)  Readmission? No  1st IM letter given? Yes - 03/19/2024  1st  letter given: No     03/19/24 0845   Service Assessment   Patient Orientation Alert and Oriented   Cognition Alert   History Provided By Patient   Primary Caregiver Self   Accompanied By/Relationship no one   Support Systems Friends/Neighbors  (KALYAN Fernandes (Friend)  [phone])   Patient's Healthcare Decision Maker is: Legal Next of Kin   PCP Verified by CM Yes   Last Visit to PCP Within last 3 months   Prior Functional Level Independent in ADLs/IADLs   Current Functional Level Independent in ADLs/IADLs   Can patient return to prior living arrangement Yes   Ability to make needs known: Good   Family able to assist with home care needs: Yes   Would you like for me to discuss the discharge plan with any other family members/significant others, and if so, who? Yes  (KALYAN Fernandes (Friend)  [phone])   Financial Resources Medicare   Community Resources None   Social/Functional History   Lives With Alone   Type of Home Apartment  ([facility])   Home Layout   (on the second level)   Home Access Elevator   Home Equipment None   Active  Yes   Discharge Planning   Type of Residence Apartment   Living Arrangements Alone   Current Services Prior To Admission None   Potential Assistance Needed N/A   Patient expects to be discharged to: Apartment   Condition of Participation: Discharge Planning   The Plan for Transition of Care is related to the following treatment goals: Home health/IPR/SNF  Goal: The patient/caregiver will verbalizes/displays a method to prevent falls    The patient/caregiver will verbalizes/displays a method to prevent infection and promote safety   The Patient and/or Patient Representative was provided with a Choice of Provider? Patient   The Patient and/Or Patient Representative agree with the Discharge Plan? Yes   Freedom of Choice list was

## 2024-03-19 NOTE — H&P
Hospitalist Admission Note    NAME:   Vadim Sotomayor Jr.   : 1945   MRN: [id number]     Date/Time: 3/19/2024 12:43 AM    Patient PCP: Camacho Castro MD    ______________________________________________________________________  Given the patient's current clinical presentation, I have a high level of concern for decompensation if discharged from the emergency department.  Complex decision making was performed, which includes reviewing the patient's available past medical records, laboratory results, and x-ray films.       My assessment of this patient's clinical condition and my plan of care is as follows.    Assessment / Plan:    Uncontrolled hyperglycemia  Hyperkalemia  Insulin-dependent diabetes mellitus  Stepdown  CO2 20--> trending down to 19  Anion gap 6  Blood sugar admission 563  Check B hydroxybutyrate   Insulin drip as per protocol  IV fluids  Given IV calcium in ER  Hyperkalemia improving while on hydration    Syncope  ?  Subdural hematoma  Soft BP  Patient without focal deficit and mental status is stable and he is AAO x 4  Syncope likely due to hypotension/significant anemia  Avoid hypotension  Troponin x 2 negative  Neurosurgery contacted recommended repeat CT head in a.m. and if images nondiagnostic then to obtain MRI of the brain.  Did not recommend ICU level of care  IV fluids as above  Cardiology consult  Echocardiogram  PT/OT  Neurocheck    Chest pain in the emergency room  As per ER, \"Patient developed chest pain while in the ED and took some of his own nitroglycerin without letting us know. Repeat EKG with sinus rhythm, left-axis deviation, RBBB, ST changes and TWI in inferior leads \"  Chest pain could be related to symptomatic anemia  Holding of aspirin given low hemoglobin  Holding beta-blockers given soft blood pressure  Chest x-ray with clear lungs  Echocardiogram  Cardiology consult    Anemia, macrocytic  History of ulcerative colitis  History of multiple transfusions in the  136/71 -- -- 77 21 99 % -- --   24 1858 127/66 -- -- 78 22 100 % -- --   24 1748 (!) 116/56 97.8 °F (36.6 °C) Oral 71 15 97 % 1.793 m (5' 10.6\") 87.2 kg (192 lb 3.2 oz)       Temp (24hrs), Av.8 °F (36.6 °C), Min:97.8 °F (36.6 °C), Max:97.8 °F (36.6 °C)        O2 Device: None (Room air)    Wt Readings from Last 12 Encounters:   24 87.2 kg (192 lb 3.2 oz)         PHYSICAL EXAM:  General: WD, WN. No acute distress    EENT:  EOMI. Anicteric sclerae. MMM  Resp:  CTA bilaterally, no wheezing or rales.  No accessory muscle use  CV:  Regular  rhythm,  No edema  GI/:  Soft, Non distended, Non tender.  +Bowel sounds  Neurologic:  Alert and oriented X 3, normal speech, No facial asymmetry. Symmetrical strength, Sensation grossly intact.  Psych:   Not anxious nor agitated  Skin/MSK: No rashes. No jaundice.  Multiple bruises of upper extremity. No clubbing      LAB DATA REVIEWED:    Recent Results (from the past 12 hour(s))   POCT Glucose    Collection Time: 24  5:49 PM   Result Value Ref Range    POC Glucose 586 (H) 65 - 117 mg/dL    Performed by: Abbot Price EDCHELLE    EKG 12 Lead    Collection Time: 24  5:53 PM   Result Value Ref Range    Ventricular Rate 74 BPM    Atrial Rate 74 BPM    P-R Interval 190 ms    QRS Duration 132 ms    Q-T Interval 430 ms    QTc Calculation (Bazett) 477 ms    P Axis 45 degrees    R Axis -65 degrees    T Axis 83 degrees    Diagnosis       Sinus rhythm with premature atrial complexes  Right bundle branch block  Left anterior fascicular block  ** Bifascicular block **  T wave abnormality, consider lateral ischemia  When compared with ECG of 2005 14:58,  Previous ECG has undetermined rhythm, needs review  (RBBB and left anterior fascicular block) is now present     CBC with Auto Differential    Collection Time: 24  6:13 PM   Result Value Ref Range    WBC 11.8 (H) 4.1 - 11.1 K/uL    RBC 2.38 (L) 4.10 - 5.70 M/uL    Hemoglobin 7.3 (L) 12.1 - 17.0 g/dL

## 2024-03-19 NOTE — ED NOTES
Report called to PCU using SBAR method, discussed imaging and lab results, medications, and pt's diagnoses, all questions answered.  TYRESE Rice accepted care at this time.

## 2024-03-19 NOTE — PROGRESS NOTES
Physical Therapy     Chart reviewed up to date and orders acknowledged. Attempted to see pt for PT eval. Pt was in the process of leaving the unit with transport for testing. Will defer but continue to follow.     Lea Echeverria PT, DPT, NCS

## 2024-03-19 NOTE — PROGRESS NOTES
Attempted to see pt for OT services. Pt was in the process of leaving the unit with transport for testing. Will defer but continue to follow.

## 2024-03-20 ENCOUNTER — ANESTHESIA (OUTPATIENT)
Facility: HOSPITAL | Age: 79
End: 2024-03-20
Payer: MEDICARE

## 2024-03-20 ENCOUNTER — APPOINTMENT (OUTPATIENT)
Facility: HOSPITAL | Age: 79
End: 2024-03-20
Payer: MEDICARE

## 2024-03-20 ENCOUNTER — TELEPHONE (OUTPATIENT)
Facility: HOSPITAL | Age: 79
End: 2024-03-20

## 2024-03-20 PROBLEM — K74.69 OTHER CIRRHOSIS OF LIVER (HCC): Status: ACTIVE | Noted: 2024-03-20

## 2024-03-20 LAB
ALBUMIN SERPL-MCNC: 2.5 G/DL (ref 3.5–5)
ALBUMIN/GLOB SERPL: 0.8 (ref 1.1–2.2)
ALP SERPL-CCNC: 374 U/L (ref 45–117)
ALT SERPL-CCNC: 73 U/L (ref 12–78)
ANION GAP SERPL CALC-SCNC: 6 MMOL/L (ref 5–15)
AST SERPL-CCNC: 70 U/L (ref 15–37)
BASOPHILS # BLD: 0.1 K/UL (ref 0–0.1)
BASOPHILS NFR BLD: 1 % (ref 0–1)
BILIRUB SERPL-MCNC: 1.2 MG/DL (ref 0.2–1)
BUN SERPL-MCNC: 18 MG/DL (ref 6–20)
BUN/CREAT SERPL: 15 (ref 12–20)
CALCIUM SERPL-MCNC: 8.6 MG/DL (ref 8.5–10.1)
CHLORIDE SERPL-SCNC: 107 MMOL/L (ref 97–108)
CHOLEST SERPL-MCNC: 154 MG/DL
CO2 SERPL-SCNC: 20 MMOL/L (ref 21–32)
CREAT SERPL-MCNC: 1.18 MG/DL (ref 0.7–1.3)
DIFFERENTIAL METHOD BLD: ABNORMAL
EOSINOPHIL # BLD: 0.3 K/UL (ref 0–0.4)
EOSINOPHIL NFR BLD: 3 % (ref 0–7)
ERYTHROCYTE [DISTWIDTH] IN BLOOD BY AUTOMATED COUNT: 16.1 % (ref 11.5–14.5)
GLOBULIN SER CALC-MCNC: 3.2 G/DL (ref 2–4)
GLUCOSE BLD STRIP.AUTO-MCNC: 103 MG/DL (ref 65–117)
GLUCOSE BLD STRIP.AUTO-MCNC: 120 MG/DL (ref 65–117)
GLUCOSE BLD STRIP.AUTO-MCNC: 223 MG/DL (ref 65–117)
GLUCOSE BLD STRIP.AUTO-MCNC: 235 MG/DL (ref 65–117)
GLUCOSE SERPL-MCNC: 101 MG/DL (ref 65–100)
HCT VFR BLD AUTO: 23.2 % (ref 36.6–50.3)
HDLC SERPL-MCNC: 35 MG/DL
HDLC SERPL: 4.4 (ref 0–5)
HGB BLD-MCNC: 7.2 G/DL (ref 12.1–17)
IMM GRANULOCYTES # BLD AUTO: 0 K/UL (ref 0–0.04)
IMM GRANULOCYTES NFR BLD AUTO: 0 % (ref 0–0.5)
LDLC SERPL CALC-MCNC: 46.6 MG/DL (ref 0–100)
LYMPHOCYTES # BLD: 0.8 K/UL (ref 0.8–3.5)
LYMPHOCYTES NFR BLD: 10 % (ref 12–49)
MCH RBC QN AUTO: 30.6 PG (ref 26–34)
MCHC RBC AUTO-ENTMCNC: 31 G/DL (ref 30–36.5)
MCV RBC AUTO: 98.7 FL (ref 80–99)
MONOCYTES # BLD: 1.1 K/UL (ref 0–1)
MONOCYTES NFR BLD: 14 % (ref 5–13)
NEUTS SEG # BLD: 6 K/UL (ref 1.8–8)
NEUTS SEG NFR BLD: 73 % (ref 32–75)
NRBC # BLD: 0 K/UL (ref 0–0.01)
NRBC BLD-RTO: 0 PER 100 WBC
PLATELET # BLD AUTO: 246 K/UL (ref 150–400)
PMV BLD AUTO: 10.6 FL (ref 8.9–12.9)
POTASSIUM SERPL-SCNC: 4.2 MMOL/L (ref 3.5–5.1)
PROT SERPL-MCNC: 5.7 G/DL (ref 6.4–8.2)
RBC # BLD AUTO: 2.35 M/UL (ref 4.1–5.7)
SERVICE CMNT-IMP: ABNORMAL
SERVICE CMNT-IMP: NORMAL
SODIUM SERPL-SCNC: 133 MMOL/L (ref 136–145)
TRIGL SERPL-MCNC: 362 MG/DL
VLDLC SERPL CALC-MCNC: 72.4 MG/DL
WBC # BLD AUTO: 8.3 K/UL (ref 4.1–11.1)

## 2024-03-20 PROCEDURE — 6360000004 HC RX CONTRAST MEDICATION: Performed by: INTERNAL MEDICINE

## 2024-03-20 PROCEDURE — 6370000000 HC RX 637 (ALT 250 FOR IP): Performed by: INTERNAL MEDICINE

## 2024-03-20 PROCEDURE — 99222 1ST HOSP IP/OBS MODERATE 55: CPT | Performed by: CLINICAL NURSE SPECIALIST

## 2024-03-20 PROCEDURE — 2060000000 HC ICU INTERMEDIATE R&B

## 2024-03-20 PROCEDURE — 80053 COMPREHEN METABOLIC PANEL: CPT

## 2024-03-20 PROCEDURE — 7100000010 HC PHASE II RECOVERY - FIRST 15 MIN: Performed by: INTERNAL MEDICINE

## 2024-03-20 PROCEDURE — 97161 PT EVAL LOW COMPLEX 20 MIN: CPT

## 2024-03-20 PROCEDURE — 80061 LIPID PANEL: CPT

## 2024-03-20 PROCEDURE — 85025 COMPLETE CBC W/AUTO DIFF WBC: CPT

## 2024-03-20 PROCEDURE — 3700000001 HC ADD 15 MINUTES (ANESTHESIA): Performed by: INTERNAL MEDICINE

## 2024-03-20 PROCEDURE — 3600007512: Performed by: INTERNAL MEDICINE

## 2024-03-20 PROCEDURE — 92610 EVALUATE SWALLOWING FUNCTION: CPT

## 2024-03-20 PROCEDURE — 2500000003 HC RX 250 WO HCPCS: Performed by: NURSE ANESTHETIST, CERTIFIED REGISTERED

## 2024-03-20 PROCEDURE — 70498 CT ANGIOGRAPHY NECK: CPT

## 2024-03-20 PROCEDURE — 2580000003 HC RX 258: Performed by: GENERAL ACUTE CARE HOSPITAL

## 2024-03-20 PROCEDURE — 82962 GLUCOSE BLOOD TEST: CPT

## 2024-03-20 PROCEDURE — 6370000000 HC RX 637 (ALT 250 FOR IP): Performed by: CLINICAL NURSE SPECIALIST

## 2024-03-20 PROCEDURE — 97165 OT EVAL LOW COMPLEX 30 MIN: CPT | Performed by: OCCUPATIONAL THERAPIST

## 2024-03-20 PROCEDURE — 6370000000 HC RX 637 (ALT 250 FOR IP): Performed by: STUDENT IN AN ORGANIZED HEALTH CARE EDUCATION/TRAINING PROGRAM

## 2024-03-20 PROCEDURE — 88342 IMHCHEM/IMCYTCHM 1ST ANTB: CPT

## 2024-03-20 PROCEDURE — 0DB98ZX EXCISION OF DUODENUM, VIA NATURAL OR ARTIFICIAL OPENING ENDOSCOPIC, DIAGNOSTIC: ICD-10-PCS | Performed by: INTERNAL MEDICINE

## 2024-03-20 PROCEDURE — 2709999900 HC NON-CHARGEABLE SUPPLY: Performed by: INTERNAL MEDICINE

## 2024-03-20 PROCEDURE — 4A03X5D MEASUREMENT OF ARTERIAL FLOW, INTRACRANIAL, EXTERNAL APPROACH: ICD-10-PCS | Performed by: STUDENT IN AN ORGANIZED HEALTH CARE EDUCATION/TRAINING PROGRAM

## 2024-03-20 PROCEDURE — 99222 1ST HOSP IP/OBS MODERATE 55: CPT | Performed by: INTERNAL MEDICINE

## 2024-03-20 PROCEDURE — 88305 TISSUE EXAM BY PATHOLOGIST: CPT

## 2024-03-20 PROCEDURE — 3700000000 HC ANESTHESIA ATTENDED CARE: Performed by: INTERNAL MEDICINE

## 2024-03-20 PROCEDURE — 3600007502: Performed by: INTERNAL MEDICINE

## 2024-03-20 PROCEDURE — 97535 SELF CARE MNGMENT TRAINING: CPT | Performed by: OCCUPATIONAL THERAPIST

## 2024-03-20 PROCEDURE — 36415 COLL VENOUS BLD VENIPUNCTURE: CPT

## 2024-03-20 PROCEDURE — 6360000002 HC RX W HCPCS: Performed by: NURSE ANESTHETIST, CERTIFIED REGISTERED

## 2024-03-20 PROCEDURE — 7100000011 HC PHASE II RECOVERY - ADDTL 15 MIN: Performed by: INTERNAL MEDICINE

## 2024-03-20 PROCEDURE — 97530 THERAPEUTIC ACTIVITIES: CPT

## 2024-03-20 RX ORDER — PANTOPRAZOLE SODIUM 40 MG/1
40 TABLET, DELAYED RELEASE ORAL
Status: DISCONTINUED | OUTPATIENT
Start: 2024-03-21 | End: 2024-03-22 | Stop reason: HOSPADM

## 2024-03-20 RX ORDER — ATORVASTATIN CALCIUM 40 MG/1
80 TABLET, FILM COATED ORAL NIGHTLY
Status: DISCONTINUED | OUTPATIENT
Start: 2024-03-20 | End: 2024-03-22 | Stop reason: HOSPADM

## 2024-03-20 RX ORDER — NADOLOL 40 MG/1
10 TABLET ORAL DAILY
Status: DISCONTINUED | OUTPATIENT
Start: 2024-03-21 | End: 2024-03-21

## 2024-03-20 RX ORDER — NADOLOL 40 MG/1
40 TABLET ORAL DAILY
Status: DISCONTINUED | OUTPATIENT
Start: 2024-03-21 | End: 2024-03-21

## 2024-03-20 RX ORDER — PANTOPRAZOLE SODIUM 40 MG/1
40 TABLET, DELAYED RELEASE ORAL
Status: DISCONTINUED | OUTPATIENT
Start: 2024-03-21 | End: 2024-03-20

## 2024-03-20 RX ORDER — MIDODRINE HYDROCHLORIDE 5 MG/1
10 TABLET ORAL
Status: DISCONTINUED | OUTPATIENT
Start: 2024-03-20 | End: 2024-03-21

## 2024-03-20 RX ORDER — PANTOPRAZOLE SODIUM 40 MG/1
40 TABLET, DELAYED RELEASE ORAL DAILY
Status: CANCELLED | OUTPATIENT
Start: 2024-03-20

## 2024-03-20 RX ORDER — LABETALOL HYDROCHLORIDE 5 MG/ML
10 INJECTION INTRAVENOUS EVERY 4 HOURS PRN
Status: DISCONTINUED | OUTPATIENT
Start: 2024-03-20 | End: 2024-03-22 | Stop reason: HOSPADM

## 2024-03-20 RX ORDER — BISACODYL 5 MG/1
5 TABLET, DELAYED RELEASE ORAL DAILY PRN
Status: DISCONTINUED | OUTPATIENT
Start: 2024-03-20 | End: 2024-03-22 | Stop reason: HOSPADM

## 2024-03-20 RX ORDER — LIDOCAINE HYDROCHLORIDE 20 MG/ML
INJECTION, SOLUTION EPIDURAL; INFILTRATION; INTRACAUDAL; PERINEURAL PRN
Status: DISCONTINUED | OUTPATIENT
Start: 2024-03-20 | End: 2024-03-20 | Stop reason: SDUPTHER

## 2024-03-20 RX ORDER — NADOLOL 40 MG/1
20 TABLET ORAL DAILY
Status: CANCELLED | OUTPATIENT
Start: 2024-03-21

## 2024-03-20 RX ORDER — NADOLOL 40 MG/1
20 TABLET ORAL DAILY
Status: DISCONTINUED | OUTPATIENT
Start: 2024-03-21 | End: 2024-03-20

## 2024-03-20 RX ADMIN — MIDODRINE HYDROCHLORIDE 10 MG: 5 TABLET ORAL at 13:40

## 2024-03-20 RX ADMIN — PROPOFOL 50 MG: 10 INJECTION, EMULSION INTRAVENOUS at 10:28

## 2024-03-20 RX ADMIN — SODIUM CHLORIDE, PRESERVATIVE FREE 10 ML: 5 INJECTION INTRAVENOUS at 08:32

## 2024-03-20 RX ADMIN — IOPAMIDOL 100 ML: 755 INJECTION, SOLUTION INTRAVENOUS at 11:39

## 2024-03-20 RX ADMIN — PROPOFOL 100 MG: 10 INJECTION, EMULSION INTRAVENOUS at 10:24

## 2024-03-20 RX ADMIN — SODIUM CHLORIDE: 9 INJECTION, SOLUTION INTRAVENOUS at 10:20

## 2024-03-20 RX ADMIN — INSULIN HUMAN 16 UNITS: 100 INJECTION, SUSPENSION SUBCUTANEOUS at 21:09

## 2024-03-20 RX ADMIN — MIDODRINE HYDROCHLORIDE 10 MG: 5 TABLET ORAL at 08:31

## 2024-03-20 RX ADMIN — MIDODRINE HYDROCHLORIDE 10 MG: 5 TABLET ORAL at 17:15

## 2024-03-20 RX ADMIN — INSULIN HUMAN 16 UNITS: 100 INJECTION, SUSPENSION SUBCUTANEOUS at 13:40

## 2024-03-20 RX ADMIN — LIDOCAINE HYDROCHLORIDE 100 MG: 20 INJECTION, SOLUTION EPIDURAL; INFILTRATION; INTRACAUDAL; PERINEURAL at 10:23

## 2024-03-20 ASSESSMENT — PAIN DESCRIPTION - DESCRIPTORS: DESCRIPTORS: ACHING

## 2024-03-20 ASSESSMENT — PAIN SCALES - GENERAL
PAINLEVEL_OUTOF10: 0
PAINLEVEL_OUTOF10: 0
PAINLEVEL_OUTOF10: 8

## 2024-03-20 ASSESSMENT — PAIN DESCRIPTION - LOCATION: LOCATION: HEAD

## 2024-03-20 NOTE — PLAN OF CARE
Speech LAnguage Pathology EVALUATION    Patient: Vadim Sotomayor Jr. (78 y.o. male)  Date: 3/20/2024  Primary Diagnosis: Syncope and collapse [R55]  Hyperkalemia [E87.5]  Hyperglycemia [R73.9]  Abnormal brain CT [R90.89]  Anemia, unspecified type [D64.9]  Procedure(s) (LRB):  ESOPHAGOGASTRODUODENOSCOPY (N/A) Day of Surgery   Precautions:                     ASSESSMENT :  Based on the objective data described below, the patient presents with a presumed functional oropharyngeal swallow. 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 Patient observed with minimal L labial asymmetry. Mastication assessed to be timely and complete. Patient observed to independently utilize a liquid wash to assist with oral cavity clearance. No overt difficulty or overt signs of aspiration observed with any consistency trialed.    Patient denied changes in motor speech, language, or cognitive function. Patient intelligible at the conversation level. Patient scored 29/30 on this Orientation Log (O-Log). Note a score of 25 or better is associated with normal orientation. At this time, will not restrict diet. Patient is certainly at risk for aspiration related to \"acute bilateral frontal convexity subdural hematomas as well as small acute intraparenchymal hemorrhagic contusions in the bilateral inferior frontal lobes, trace intraventricular hemorrhage acutely in the occipital horns as well as a small acute infarct in the right inferior cerebellum with associated petechial hemorrhage.\" Will follow acutely.    Patient will benefit from skilled intervention to address the above impairments.     PLAN :  Recommendations and Planned Interventions:  Diet: Regular and thin liquids  -- Medication as tolerated  -- Routine oral care  -- Aspiration precautions:

## 2024-03-20 NOTE — ANESTHESIA PRE PROCEDURE
Department of Anesthesiology  Preprocedure Note       Name:  Vadim Sotomayor Jr.   Age:  78 y.o.  :  1945                                          MRN:  [id number]         Date:  3/20/2024      Surgeon: Surgeon(s):  Moe Rios MD    Procedure: Procedure(s):  ESOPHAGOGASTRODUODENOSCOPY    Medications prior to admission:   Prior to Admission medications    Medication Sig Start Date End Date Taking? Authorizing Provider   hydrOXYzine HCl (ATARAX) 10 MG tablet Take 1 tablet by mouth 2 times daily as needed for Anxiety   Yes Mei Sinha MD   pantoprazole (PROTONIX) 40 MG tablet Take 1 tablet by mouth daily   Yes Mei Sinha MD   predniSONE (DELTASONE) 20 MG tablet Take 1 tablet by mouth daily   Yes Mei Sinha MD   nitroGLYCERIN (NITROSTAT) 0.4 MG SL tablet Place 1 tablet under the tongue every 5 minutes as needed for Chest pain up to max of 3 total doses. If no relief after 1 dose, call 911.   Yes Mei Sinha MD   rosuvastatin (CRESTOR) 20 MG tablet Take 1 tablet by mouth daily   Yes Mei Sinha MD   insulin lispro (HUMALOG) 100 UNIT/ML SOLN injection vial Inject 12 Units into the skin 3 times daily (before meals)   Yes Mei Sinha MD   insulin NPH (HUMULIN N;NOVOLIN N) 100 UNIT/ML injection vial Inject 50 Units into the skin nightly   Yes Mei Sinha MD   midodrine (PROAMATINE) 10 MG tablet Take 1.5 tablets by mouth 3 times daily   Yes Mei Sinha MD   traZODone (DESYREL) 100 MG tablet Take 1 tablet by mouth nightly   Yes Mei Sinha MD   spironolactone (ALDACTONE) 100 MG tablet Take 1 tablet by mouth 2 times daily   Yes Mei Sinha MD   FLUoxetine (PROZAC) 20 MG capsule Take 1 capsule by mouth daily   Yes Mei Sinha MD   mercaptopurine (PURINETHOL) 50 MG chemo tablet Take 0.5 tablets by mouth daily   Yes Mei Sinha MD   loperamide (IMODIUM) 2 MG capsule Take 1 capsule by mouth 4 times

## 2024-03-20 NOTE — PERIOP NOTE
The risks and benefits of the bite block have been explained to patient.  Patient verbalizes understanding.

## 2024-03-20 NOTE — WOUND CARE
Wound care attempted to see patient for the feet today (consulted by nursing) but patient is in with the diabetes team and he is in the chair.  Photographs do not show any open wounds and this was confirmed by TYRESE Bean today.  She will further assess the heels and pulses and test for neuropathy (sensation) and float the heels.         Plan: Pressure injury prevention , keep the legs uncrossed if at all possible. Float the heels and hygiene for the feet - soap / water and lotion (purple tube).   Evette Trevino RN, BSN, CWON

## 2024-03-20 NOTE — PROGRESS NOTES
End of Shift Note    Bedside shift change report given to TYRESE Wilson (oncoming nurse) by Daniel Nguyen RN (offgoing nurse).  Report included the following information SBAR, Procedure Summary, Intake/Output, MAR, Recent Results, Med Rec Status, Cardiac Rhythm SR, and Alarm Parameters     Shift worked:  7a-7p     Shift summary and any significant changes:     EGD completed     Concerns for physician to address:  none     Zone phone for oncoming shift:          Activity:     Number times ambulated in hallways past shift: 0  Number of times OOB to chair past shift: 3    Cardiac:   Cardiac Monitoring: Yes           Access:  Current line(s): PIV     Genitourinary:   Urinary status: voiding    Respiratory:      Chronic home O2 use?: NO  Incentive spirometer at bedside: N/A       GI:     Current diet:  ADULT DIET; Regular; 4 carb choices (60 gm/meal)  Passing flatus: YES  Tolerating current diet: YES       Pain Management:   Patient states pain is manageable on current regimen: YES    Skin:     Interventions: specialty bed, float heels, increase time out of bed, PT/OT consult, limit briefs, internal/external urinary devices, and nutritional support    Patient Safety:  Fall Score:    Interventions: bed/chair alarm, assistive device (walker, cane. etc), gripper socks, pt to call before getting OOB, and stay with me (per policy)       Length of Stay:  Expected LOS: 4  Actual LOS: 1      Daniel Nguyen RN

## 2024-03-20 NOTE — PLAN OF CARE
no major buckling or LOB noted. He was left end of session seated in chair, all needs met, call bell in reach and chair alarm on. Coordination testing revealing no major asymmetrys L vs. R or from pt baseline. Formal Walden balance test not performed 2/2 orthostasis with sit to stand (>30 point mmhg drop), will perform next visit for accurate balance assessment post CVA. He is below his mobility baseline and likely will need HH at [facility]. Will continue to follow.    Patient will benefit from skilled intervention to address the above impairments.    Functional Outcome Measure:  The patient scored 20/24 on the [facility] outcome measure which is indicative of need for continued therapy at [facility].           PLAN :  Recommendations and Planned Interventions:   bed mobility training, transfer training, gait training, therapeutic exercises, neuromuscular re-education, edema management/control, patient and family training/education, and therapeutic activities    Frequency/Duration: Patient will be followed by physical therapy to address goals, PT Plan of Care: 3 times/week to address goals.    Recommendation for discharge: (in order for the patient to meet his/her long term goals): Therapy 2 days/week in the home    Other factors to consider for discharge: lives alone and high risk for falls, orthostatic hypotension    IF patient discharges home will need the following DME: continuing to assess with progress         SUBJECTIVE:   Patient stated “I'd like to get up and move.”    OBJECTIVE DATA SUMMARY:       Past Medical History:   Diagnosis Date    Diabetes mellitus (HCC)     Ulcerative colitis (HCC)      Past Surgical History:   Procedure Laterality Date    HEMORRHOID SURGERY      TOTAL HIP ARTHROPLASTY      UPPER GASTROINTESTINAL ENDOSCOPY N/A 3/20/2024    ESOPHAGOGASTRODUODENOSCOPY performed by Moe Rios MD at [facility] ENDOSCOPY       Home Situation:  Social/Functional History  Lives With: Alone  Type of Home: Apartment ([facility]  with home health or need of SNF/IPR.    1. Vijaya Squires, Dara Sneed, El Hughes, Marisela Oakley, Jr Chadwick, Rafal Squires.  Validity of the AM-PAC “6-Clicks” Inpatient Daily Activity and Basic Mobility Short Forms. Physical Therapy Mar 2014, [phone]; DOI: 10.2522/ptj.69870672  2. Kee QUINTEROS, Rashid MAYEN, Yuliet MAYEN, Macey MAYEN. Association of AM-PAC \"6-Clicks\" Basic Mobility and Daily Activity Scores With Discharge Destination. Phys Ther. 2021 Apr 4;101(4):axnm653. doi: 10.1093/ptj/hpmf560. PMID: 02772941.  3. Adrianne MAYEN, Angel DAHL, Ysabel S, Shree K, John S. Activity Measure for Post-Acute Care \"6-Clicks\" Basic Mobility Scores Predict Discharge Destination After Acute Care Hospitalization in Select Patient Groups: A Retrospective, Observational Study. Arch Rehabil Res Clin Transl. 2022 Jul 16;4(3):709653. doi: 10.1016/j.arrct.2022.090868. PMID: 80987079; PMCID: [id number].  4. Tavia OVIEDO, Marilynn S, Sloane W, Dolores P. AM-PAC Short Forms Manual 4.0. Revised 2/2020.                                                                                                                                                                                                                              Pain Rating:  Denies pain  Pain Intervention(s):   nursing notified and rest    Activity Tolerance:   Good and requires rest breaks    After treatment:   Patient left in no apparent distress sitting up in chair, Call bell within reach, Bed/ chair alarm activated, and Heels elevated for pressure relief    COMMUNICATION/EDUCATION:   The patient's plan of care was discussed with: occupational therapist, registered nurse, and     Patient Education  Education Given To: Patient  Education Provided: Role of Therapy;Plan of Care;Transfer Training;Fall Prevention Strategies  Education Method: Verbal  Barriers to Learning: None  Education Outcome: Verbalized understanding;Continued education needed    Thank you

## 2024-03-20 NOTE — PERIOP NOTE
TRANSFER - OUT REPORT:    Verbal report given to Geneva XIONG on Vadim Sotomayor Jr.  being transferred to [place] [address] for routine progression of patient care       Report consisted of patient's Situation, Background, Assessment and   Recommendations(SBAR).     Information from the following report(s) Nurse Handoff Report was reviewed with the receiving nurse.           Lines:   Peripheral IV Left;Posterior Wrist (Active)   Site Assessment Clean, dry & intact 03/20/24 0843   Line Status Flushed;Normal saline locked;Capped 03/20/24 0843   Line Care Ports disinfected;Cap changed 03/20/24 0843   Phlebitis Assessment No symptoms 03/20/24 0843   Infiltration Assessment 0 03/20/24 0843   Alcohol Cap Used Yes 03/20/24 0843   Dressing Status Clean, dry & intact 03/20/24 0843   Dressing Type Transparent 03/20/24 0843       Peripheral IV 03/20/24 Left Forearm (Active)   Site Assessment Clean, dry & intact 03/20/24 1044   Line Status Capped 03/20/24 1044   Phlebitis Assessment No symptoms 03/20/24 1044   Infiltration Assessment 0 03/20/24 1044   Alcohol Cap Used Yes 03/20/24 1044   Dressing Status Clean, dry & intact 03/20/24 1044        Opportunity for questions and clarification was provided.

## 2024-03-20 NOTE — OP NOTE
NAME:  Vadim Sotomayor Jr.   :   1945   MRN:   [id number]     Date/Time:  3/20/2024 10:46 AM    Esophagogastroduodenoscopy (EGD) Procedure Note    Procedure: Esophagogastroduodenoscopy with biopsy    Indication: Anemia  Pre-operative Diagnosis: see indication above  Post-operative Diagnosis: see findings below  :  Moe Rios MD  Referring Provider:   -Ira Velez MD;-Camacho Castro MD    Exam:  Airway: clear, no airway problems anticipated  Heart: RRR, without gallops or rubs  Lungs: clear bilaterally without wheezes, crackles, or rhonchi  Abdomen: soft, nontender, nondistended, bowel sounds present  Mental Status: awake, alert and oriented to person, place and time     Anethesia/Sedation:  MAC anesthesia Propofol 150mg IV  Procedure Details   After informed consent was obtained for the procedure, with all risks and benefits of procedure explained the patient was taken to the endoscopy suite and placed in the left lateral decubitus position.  Following sequential administration of sedation as per above, the [id number] gastroscope was inserted into the mouth and advanced under direct vision to second portion of the duodenum.  A careful inspection was made as the gastroscope was withdrawn, including a retroflexed view of the proximal stomach; findings and interventions are described below.                  Findings:    -Grade 1 esophageal varices as two columns from 35-42cm; no stigmata of bleeding noted  -Small sliding hiatal hernia from 42-45cm with evidence of gastropathy in hernia to suggest mechanical trauma such as Eric erosion, but no varices  -Patchy gastric erythema (redness) to suggest gastropathy; biopsied  -Duodenal bulbar erythema, possibly representing duodenitis; biopsied  -The remainder of the duodenum and ampulla appear normal with ready bile efflux noted.     Therapies:  biopsy of stomach; biopsy of duodenal bulb  Specimens: #1 duodenal bulb; #2 gastric  EBL:  None.

## 2024-03-20 NOTE — PROGRESS NOTES
1900H: Bedside shift change report given to JENSEN XIONG (oncoming nurse) by HARJEET XIONG (offgoing nurse). Report included the following information Nurse Handoff Report, Intake/Output, MAR, Recent Results, Med Rec Status, and Cardiac Rhythm SINUS RHYTHM .      End of Shift Note    Bedside shift change report given to MAME XIONG (oncoming nurse) by Jensen Hamilton RN (offgoing nurse).  Report included the following information SBAR, Intake/Output, MAR, Recent Results, Med Rec Status, and Cardiac Rhythm SINUS RHYTHM    Shift worked:  1900H-0730H     Shift summary and any significant changes:     Instructed NPO post midnight; CHG bath given; still to be consented     Concerns for physician to address:       Zone phone for oncoming shift:          Activity:     Number times ambulated in hallways past shift: 0  Number of times OOB to chair past shift: 0    Cardiac:   Cardiac Monitoring: Yes           Access:  Current line(s): PIV     Genitourinary:   Urinary status: voiding    Respiratory:      Chronic home O2 use?: NO  Incentive spirometer at bedside: N/A       GI:     Current diet:  Diet NPO  Passing flatus: YES  Tolerating current diet: YES       Pain Management:   Patient states pain is manageable on current regimen: YES    Skin:     Interventions: internal/external urinary devices    Patient Safety:  Fall Score:    Interventions: bed/chair alarm, assistive device (walker, cane. etc), gripper socks, and pt to call before getting OOB       Length of Stay:  Expected LOS: 4  Actual LOS: 1      Jensen Hamilton RN

## 2024-03-20 NOTE — PROGRESS NOTES
Attempted to see pt for OT services.  Pt was leaving the floor for endo.  Will defer but continue to follow.

## 2024-03-20 NOTE — PLAN OF CARE
Problem: Pain  Goal: Verbalizes/displays adequate comfort level or baseline comfort level  3/19/2024 2220 by Jane Hamilton RN  Outcome: Progressing  3/19/2024 2101 by Yomaira Ballard RN  Outcome: Progressing     Problem: Safety - Adult  Goal: Free from fall injury  3/19/2024 2220 by Jane Hamilton RN  Outcome: Progressing  3/19/2024 2101 by Yomaira Ballard RN  Outcome: Progressing

## 2024-03-20 NOTE — PROGRESS NOTES
0740- Bedside and Verbal shift change report given to TYRESE Burnette (oncoming nurse) by TYRESE Rice  (offgoing nurse). Report included the following information Nurse Handoff Report, MAR, Recent Results, and Cardiac Rhythm NSR  .     0915- Patient off unit for CT scan    1010- Patient back in room     1357 - Insulin gtt stopped.     1516- Gave prn ativan at 1425 in preparation of MRI and given right before pateint went off floor for MRI at 1516 per prn order.     1614- Patieint back in room.     1740- orhtostatic BP completed.     End of Shift Note    Bedside shift change report given to TYRESE Lara  (oncoming nurse) by Yomaira Ballard RN (offgoing nurse).  Report included the following information SBAR, MAR, Recent Results, and Cardiac Rhythm NSR    Shift worked:  2863-5850     Shift summary and any significant changes:     See note above, patient on room air, patient expected to be NPO at midnight for EGD tomorrow, insulin gtt stopped      Concerns for physician to address:  Hemoglobin      Zone phone for oncoming shift:   [phone]        Activity:     Number times ambulated in hallways past shift: 0  Number of times OOB to chair past shift: 0    Cardiac:   Cardiac Monitoring: Yes           Access:  Current line(s): PIV     Genitourinary:   Urinary status: voiding    Respiratory:      Chronic home O2 use?: NO  Incentive spirometer at bedside: NO       GI:     Current diet:  ADULT DIET; Clear Liquid  Passing flatus: YES  Tolerating current diet: YES       Pain Management:   Patient states pain is manageable on current regimen: YES    Skin:     Interventions: float heels, internal/external urinary devices, and nutritional support    Patient Safety:  Fall Score:    Interventions: bed/chair alarm, gripper socks, pt to call before getting OOB, and stay with me (per policy)       Length of Stay:  Expected LOS: 4  Actual LOS: 0      Yomaira Ballard RN

## 2024-03-20 NOTE — PROGRESS NOTES
Comprehensive Nutrition Assessment    Type and Reason for Visit:  Initial, Positive Nutrition Screen    Nutrition Recommendations/Plan:   Monitor intakes and weights  Continue c current 60 g CHO choice per meal  Noted referral for diabetes education upon discharge     Malnutrition Assessment:  Malnutrition Status:  No malnutrition (03/20/24 1422)    Context:  Acute Illness     Findings of the 6 clinical characteristics of malnutrition:  Energy Intake:  No significant decrease in energy intake  Weight Loss:  5% over 1 month (10% weight loss x6 weeks)     Body Fat Loss:  Mild body fat loss Buccal region   Muscle Mass Loss:  Mild muscle mass loss Scapula (trapezius), Hand (interosseous), Temples (temporalis)  Fluid Accumulation:  No significant fluid accumulation     Strength:  Not Performed    Nutrition Assessment:    Pt admitted c hemorrhagic CVA; Hyperglycemia. Mr. Sotomayor reports intentional weight loss over past 6 weeks. UBW: 218#. CBW: 196#. Mr. Sotomayor denied difficulty with appetite prior to admission. His weight loss is excessive for 6 weeks. He told this RDN that he started drinking \"a lot of water and stopped eating sweets\". Patient was eating renee crackers at time of visit. He reports good appetite. Brief education on the importance of protein intakes to prevent depletion of muscle reserves. Protein sources reviewed.  denied ONS at this time. He expressed concern about his weight. He voiced that he feels good at his current body weight. Noted Diabetes Treatment team is following Mr. Sotomayor. POC GLU: 103, 120 mg/dL. Suspect this will increase once po is initiated. Will monitor intakes and BG while inpatient. Feel current diet order is sufficient to meet his estimated energy/protein needs if consumed.     Nutrition Related Findings:    3/18/24: A1C 9.8%. DM x24 years. Wound Type: None   MEDS: Humalog, Humulin    Current Nutrition Intake & Therapies:    Average Meal Intake: Unable to assess (NPO

## 2024-03-20 NOTE — PROGRESS NOTES
Progress Note      3/20/2024 8:00 AM  NAME: Vadim Sotomayor Jr.   MRN:  [id number]   Admit Diagnosis: Syncope and collapse [R55]  Hyperkalemia [E87.5]  Hyperglycemia [R73.9]  Abnormal brain CT [R90.89]  Anemia, unspecified type [D64.9]       Primary Cardiologist: Dr Gupta  Physician Requesting consult: Dr Velez         Assessment:     Syncope  Syncope / Fall and subdural hematoma  Acute infarct of Rt inferior cerebellum   History of orthostatic hypotension, Hypotension on midodrine per pt recently increased to 15 mg TID    Right bundle branch block, left anterior fascicular block  Chest pain, troponin negative  CAD s/p CABG, does report intermittent anginal chest pain, stress test 1 year ago was unremarkable  Insulin-dependent diabetes mellitus with hyperglycemia  anemia  (hemoglobin around 12 2 weeks ago, on admission it is 7.3), history of blood transfusions  History of ulcerative colitis  Acute renal insufficiency  Abnormal LFT           Troponin normal  Hemoglobin 7.3> 7.1  Creatinine 1.54> 1.11     CT head 3/19   1.  New/developing hypodensity within the inferior right cerebellum associated with punctate hemorrhage. This may be due to edema, but new infarct not excluded. Recommend MRI brain. 2.  Right occipital bone fracture overlying the new cerebellar hypodensity. 3.  Stable small bilateral inferior frontal lobe subdural hematomas.   CT  3/181.  Acute bilateral inferior frontal lobe subdural hematomas without significant edema or mass effect. 2.  No intraparenchymal hemorrhage. 3.  No acute fracture.      Echo with EF 40-45% range     Follows with Dr. Gupta with [facility]    MRI:       1. Small acute infarct in the right inferior cerebellum with associated  petechial hemorrhage.  2. Stable thin acute bilateral frontal convexity subdural hematomas without  significant mass effect.  3. Small acute intraparenchymal hemorrhagic contusions in the bilateral inferior  frontal lobes,  Data Personally Reviewed:    Recent Labs     03/19/24  1048 03/20/24  0409   WBC 10.6 8.3   HGB 7.1* 7.2*   HCT 23.0* 23.2*    246     Recent Labs     03/18/24  2216   INR 1.1   APTT 24.5      Recent Labs     03/19/24  0546 03/19/24  1048 03/19/24  1627 03/20/24  0409   * 134* 133* 133*   K 4.3 4.2 3.9 4.2   * 108 107 107   CO2 19* 22 22 20*   BUN 24* 21* 20 18   MG 2.1 2.0 2.1  --      No results for input(s): \"CPK\" in the last 72 hours.    Invalid input(s): \"CPKMB\", \"CKNDX\", \"TROIQ\"  No results found for: \"CHOL\", \"CHOLX\", \"CHLST\", \"CHOLV\", \"HDL\", \"HDLC\", \"LDL\", \"LDLC\", \"TGLX\", \"TRIGL\"    Recent Labs     03/18/24  1813 03/19/24  0546 03/20/24  0409   GLOB 3.6 3.1 3.2     No results for input(s): \"PH\", \"PCO2\", \"PO2\" in the last 72 hours.    Medications Personally Reviewed:    Current Facility-Administered Medications   Medication Dose Route Frequency    sodium chloride flush 0.9 % injection 5-40 mL  5-40 mL IntraVENous 2 times per day    sodium chloride flush 0.9 % injection 5-40 mL  5-40 mL IntraVENous PRN    0.9 % sodium chloride infusion   IntraVENous PRN    potassium chloride (KLOR-CON M) extended release tablet 40 mEq  40 mEq Oral PRN    Or    potassium bicarb-citric acid (EFFER-K) effervescent tablet 40 mEq  40 mEq Oral PRN    Or    potassium chloride 10 mEq/100 mL IVPB (Peripheral Line)  10 mEq IntraVENous PRN    magnesium sulfate 2000 mg in 50 mL IVPB premix  2,000 mg IntraVENous PRN    ondansetron (ZOFRAN-ODT) disintegrating tablet 4 mg  4 mg Oral Q8H PRN    Or    ondansetron (ZOFRAN) injection 4 mg  4 mg IntraVENous Q6H PRN    polyethylene glycol (GLYCOLAX) packet 17 g  17 g Oral Daily PRN    acetaminophen (TYLENOL) tablet 650 mg  650 mg Oral Q6H PRN    Or    acetaminophen (TYLENOL) suppository 650 mg  650 mg Rectal Q6H PRN    insulin NPH (HumuLIN N;NovoLIN N) injection vial 20 Units  20 Units SubCUTAneous BID    midodrine (PROAMATINE) tablet 10 mg  10 mg Oral TID WC    methyl

## 2024-03-20 NOTE — PROGRESS NOTES
Endoscopy recovery  Patient returned to baseline, vital signs stable (see vital sign flowsheet). Patient offered liquids and tolerated well. Respiratory status within defined limits. Abdomen soft not tender. Skin with in defined limits.     Pt instructed to use the call bell for any bleeding and SOB, Chest pain.    1118- pt leaving for CT Scan now with transport on monitor and room air,     the plan is to returned to room 2318  on monitor and room air.

## 2024-03-20 NOTE — PLAN OF CARE
Problem: Occupational Therapy - Adult  Goal: By Discharge: Performs self-care activities at highest level of function for planned discharge setting.  See evaluation for individualized goals.  Description: FUNCTIONAL STATUS PRIOR TO ADMISSION:  Driving yes, living alone in ILF apartment, ambulated with SPC in the community, performed ADLs and IADLS on his own   ,  ,  ,  ,  ,  ,  ,  ,  ,  , Active : Yes     HOME SUPPORT: Patient lived alone in ILF apartment.    Occupational Therapy Goals:  Initiated 3/20/2024  1.  Patient will perform grooming with Modified [place] within 7 day(s).  2.  Patient will perform upper body dressing and lower body dressing with Modified [place] with item retrieval within 7 day(s).  3.  Patient will perform toileting with Modified [place] within 7 day(s).  4.  Patient will perform toilet transfers with Modified [place]  within 7 day(s).  5.  Patient will improve fugl camp score by 3 points within 7 days.   Outcome: Not Progressing   OCCUPATIONAL THERAPY EVALUATION    Patient: Vadim Sotomayor Jr. (78 y.o. male)  Date: 3/20/2024  Primary Diagnosis: Syncope and collapse [R55]  Hyperkalemia [E87.5]  Hyperglycemia [R73.9]  Abnormal brain CT [R90.89]  Anemia, unspecified type [D64.9]  Procedure(s) (LRB):  ESOPHAGOGASTRODUODENOSCOPY (N/A) Day of Surgery     Precautions: bed alarm, orthostatic    ASSESSMENT :  The patient is limited by decreased functional mobility, independence in ADLs, high-level IADLs, body mechanics, endurance, coordination, balance, orthostatic hypotension.    Based on the impairments listed above pts alarm for the bathroom was alarming upon arrival to room.  Pt was seated edge of bed and reported just coming from the bathroom.  Educated pt on asking for assist with mobility and ADLS while in the hospital. Pt has a history of chronic orthostasis and is on chronic midodrine.  Pt had EGD this morning and had just received midodrine.  He was very

## 2024-03-20 NOTE — PROGRESS NOTES
Physical Therapy     Chart reviewed up to date.  Attempted to see pt for PT services.  Pt was leaving the floor for endo.  Will defer but continue to follow.     Lea Echeverria PT, DPT, NCS

## 2024-03-20 NOTE — PERIOP NOTE
Endoscopy Case End Note:    ***:  Procedure scope was pre-cleaned, per protocol, at bedside by Radha WINCHESTER      ***:  Report received from anesthesia - Reed HELENA.  See anesthesia flowsheet for intra-procedure vital signs and events.

## 2024-03-20 NOTE — ANESTHESIA POSTPROCEDURE EVALUATION
Department of Anesthesiology  Postprocedure Note    Patient: Vadim Sotomayor Jr.  MRN: [id number]  YOB: 1945  Date of evaluation: 3/20/2024    Procedure Summary       Date: 03/20/24 Room / Location: [facility] 04 / [facility] ENDOSCOPY    Anesthesia Start: 1023 Anesthesia Stop: 1032    Procedure: ESOPHAGOGASTRODUODENOSCOPY (Upper GI Region) Diagnosis:       Anemia, unspecified type      (Anemia, unspecified type [D64.9])    Surgeons: Moe Rios MD Responsible Provider: TIARRA Huffman MD    Anesthesia Type: general, TIVA ASA Status: 2            Anesthesia Type: No value filed.    Opal Phase I: Opal Score: 10    Opal Phase II: Opal Score: 10    Anesthesia Post Evaluation    Patient location during evaluation: bedside  Patient participation: complete - patient participated  Level of consciousness: responsive to verbal stimuli and awake and alert  Pain score: 2  Nausea & Vomiting: no nausea  Cardiovascular status: blood pressure returned to baseline  Respiratory status: acceptable  Hydration status: euvolemic  Multimodal analgesia pain management approach  Pain management: adequate    No notable events documented.

## 2024-03-20 NOTE — PROGRESS NOTES
Hospitalist Progress Note    NAME:   Vadim Sotomayor Jr.   : 1945   MRN: [id number]     Date/Time: 3/20/2024 2:31 PM  Patient PCP: Camacho Castro MD    Estimated discharge date:48h  Barriers: Neurology clearance, BP stability, blood sugar control      Assessment / Plan:  Hemorrhagic stroke: Acute infract in the right inferior cerebellum with hemorrhagic transformation  Subdural hematoma  Syncopal episode  Stroke order set initiated, spoke with neurology not a candidate for aspirin  If LDL above 70 will start statin  LFTs abnormal, will repeat CMP tomorrow  MRI of the brain showed   1. Small acute infarct in the right inferior cerebellum with associated  petechial hemorrhage.  2. Stable thin acute bilateral frontal convexity subdural hematomas without  significant mass effect.  3. Small acute intraparenchymal hemorrhagic contusions in the bilateral inferior  frontal lobes, left greater than right.  4. Trace acute intraventricular hemorrhage in the occipital horns.  5. Symmetric T1 hyperintensity in the bilateral globus pallidus, most commonly  seen with chronic liver disease, but can be seen with a variety of metabolic  disorders. Correlate clinically.    2D echo showed EF of 45%, no PFO's  Discussed with neurology, keep systolic blood pressure below 150  CT of the head and neck did not show any major vessel occlusion  Headache control  Will hold all sedating meds such as home dose trazodone  Uncontrolled hyperglycemia  Hyperkalemia  Insulin-dependent diabetes mellitus  S/p insulin drip, hyperkalemia and blood sugar improved  Currently on Novolin 16 units twice daily  Appreciate diabetic management team    Syncope  ?  Subdural hematoma  Soft BP, ?  Orthostatic hypotension  Patient without focal deficit and mental status is stable and he is AAO x 4  Syncope likely due to hypotension/significant anemia  Avoid hypotension  Troponin x 2 negative  Neurosurgery contacted recommended repeat CT head and if  labs include:  Recent Labs     03/19/24  0132 03/19/24  1048 03/20/24  0409   WBC 10.9 10.6 8.3   HGB 7.1* 7.1* 7.2*   HCT 23.7* 23.0* 23.2*    226 246     Recent Labs     03/18/24  1813 03/18/24  2214 03/18/24  2216 03/18/24  2331 03/19/24  0546 03/19/24  1048 03/19/24  1627 03/20/24  0409   *  --   --    < > 135* 134* 133* 133*   K 6.2*  --   --    < > 4.3 4.2 3.9 4.2     --   --    < > 112* 108 107 107   CO2 20*  --   --    < > 19* 22 22 20*   GLUCOSE 563*  --   --    < > 188* 129* 173* 101*   BUN 33*  --   --    < > 24* 21* 20 18   CREATININE 1.54*   < >  --    < > 1.11 1.14 1.20 1.18   CALCIUM 8.5  --   --    < > 8.5 8.7 8.5 8.6   MG  --   --   --    < > 2.1 2.0 2.1  --    PHOS  --   --   --    < > 3.0 2.8 2.9  --    LABALBU 2.7*  --   --   --  2.5*  --   --  2.5*   BILITOT 1.2*  --   --   --  0.8  --   --  1.2*   AST 49*  --   --   --  42*  --   --  70*   ALT 80*  --   --   --  68  --   --  73   INR  --   --  1.1  --   --   --   --   --     < > = values in this interval not displayed.       Signed: Jeet Meza MD

## 2024-03-20 NOTE — DIABETES MGMT
[facility]  [facility]  DIABETES MANAGEMENT CONSULT    Consulted by Provider for advanced nursing evaluation and care for inpatient blood glucose management.    Evaluation and Action Plan   Vadim Sotomayor is a 78 year old gentleman, with Type 2 Diabetes, JEFFERS induced Cirrhosis with elevated LFTs  CAD s/p CABG, Ulcerative Colitis and CKD who was admitted after a ground level fall with syncope.  Head CT was significant for acute bilateral inferior frontal lobe subdural hematomas without significant edema or mass effect. Initial labs with Glucose 563, anion gap normal, creatinine 1.54, eGFR 46, Alk Phos 422, ALT 80, AST 49, A1C 9.8, hematocrit 24.2 and hemoglobin 7.3.  He was started on an insulin gtt for management of hyperglycemia.  A brain MRI was obtained this admission which was significant for acute CVA of the right inferior cerebellum . He did undergo an EGD with GI which demonstrated grade 1 esophageal varices, small hiatal hernia and patchy gastric erythema.      The [facility] has been consulted to assist in glycemic management and advanced diabetes management assessment this admission.  Mr Sotomayor tells me that he is prescribed 50 units Humulin N HS and 12 units Humalog TID with meals.   I do suspect he may be missing insulin doses PTA and actually doesn't need these higher doses.  He doesn't check his glucose but is eating three meals daily.  Current A1C is elevated at 9.8%.  Hyperglycemia rapidly resolved with IVF and insulin.  He transitioned off with 20 units NPH Q12 and fasting glucose was below goal, 101 this morning.  NPH will reduce further today. Inpatient BG target is 140-180mg/dl.    Management Rationale Action Plan   Medication   Basal needs Using 0.35  Reduced to 16 units NPH Q12h as fasting glucose below target.    Reduce again tomorrow by 20% if fasting remains below 140mg/dl.    Corrective insulin Using medium sensitivity   ACHS   Additional orders  Continue  self-management practices impeding diabetes control  Discussed diabetes survival skills related to  Healthy Plate eating plan; given handouts  Role of physical activity in improving insulin sensitivity and action  Procedure for blood glucose monitoring & options for low-cost products  Medications plan at discharge     Billing Code(s)   61046    Before making these care recommendations, I personally reviewed the hospitalization record, including notes, laboratory & diagnostic data and current medications, and examined the patient at the bedside (circumstances permitting) before determining care. More than fifty (50) percent of the time was spent in patient counseling and/or care coordination.  Total minutes: 60    JS Vázquez - CNS  Diabetes Clinical Nurse Specialist  [facility]  Access via ICU Metrix

## 2024-03-20 NOTE — H&P
See prior Consultation Note. The patient was reexamined. No interval change in the last 30 days. Proceed with procedure(s) with deep sedation or conscious sedation as clinically indicated.

## 2024-03-20 NOTE — CONSULTS
Date of Consultation:  March 20, 2024    Referring Physician: MD Derrick     Reason for Consultation:  new stroke with hemorrhagic transformation     Chief Complaint   Patient presents with    Loss of Consciousness     Pt presents to ED via EMS and is ambulatory off stretcher. EMS reports pt had a syncopal episode at [facility] and [facility] team members gave pt 1 or 2 tabs of glucose then took BG which was 485. EMS ; pt started on 1L NS. Pt reports he did hit his head and loss consciousness. Pt c/o headache. Pt denies any SOB, chest pain, and/or N/V/D. Pt denies any blood thinners. H/o T2DM. Pt states he has been out of his BG strips and hasn't been able to check his BG in months.       History of Present Illness:   Vadim Sotomayor Jr. is a 78 y.o. male with history of type 2 diabetes, ulcerative colitis, who presents with syncope with fall and subsequent subdural hematoma, found to have an acute infarct in the right inferior cerebellum with hemorrhagic transformation.      Neurosurgery is following.  Does not appear to have any indication for surgery.    Patient is status post endoscopy this morning.  He states that he does not have any new neurological deficits such as incoordination on the right side. He has no focal weakness. He remembers standing up and having several seconds where he could feel that he was going to hit the ground and then lost consciousness.     States that his hematologist does not recommend for him to be on any antiplatelets due to his anemia.     Past Medical History:   Diagnosis Date    Diabetes mellitus (HCC)     Ulcerative colitis (HCC)         Past Surgical History:   Procedure Laterality Date    HEMORRHOID SURGERY      TOTAL HIP ARTHROPLASTY          History reviewed. No pertinent family history.     Social History     Tobacco Use    Smoking status: Never    Smokeless tobacco: Never   Substance Use Topics    Alcohol use: Not Currently        No Known Allergies     Prior to Admission

## 2024-03-21 ENCOUNTER — APPOINTMENT (OUTPATIENT)
Facility: HOSPITAL | Age: 79
End: 2024-03-21
Payer: MEDICARE

## 2024-03-21 LAB
ALBUMIN SERPL-MCNC: 2.6 G/DL (ref 3.5–5)
ALBUMIN/GLOB SERPL: 0.8 (ref 1.1–2.2)
ALP SERPL-CCNC: 448 U/L (ref 45–117)
ALT SERPL-CCNC: 82 U/L (ref 12–78)
AST SERPL-CCNC: 86 U/L (ref 15–37)
BILIRUB DIRECT SERPL-MCNC: 1.4 MG/DL (ref 0–0.2)
BILIRUB SERPL-MCNC: 2 MG/DL (ref 0.2–1)
CHOLEST SERPL-MCNC: 149 MG/DL
ERYTHROCYTE [DISTWIDTH] IN BLOOD BY AUTOMATED COUNT: 16.2 % (ref 11.5–14.5)
EST. AVERAGE GLUCOSE BLD GHB EST-MCNC: 229 MG/DL
GLOBULIN SER CALC-MCNC: 3.4 G/DL (ref 2–4)
GLUCOSE BLD STRIP.AUTO-MCNC: 216 MG/DL (ref 65–117)
GLUCOSE BLD STRIP.AUTO-MCNC: 298 MG/DL (ref 65–117)
GLUCOSE BLD STRIP.AUTO-MCNC: 315 MG/DL (ref 65–117)
GLUCOSE BLD STRIP.AUTO-MCNC: 354 MG/DL (ref 65–117)
HBA1C MFR BLD: 9.6 % (ref 4–5.6)
HCT VFR BLD AUTO: 22.5 % (ref 36.6–50.3)
HDLC SERPL-MCNC: 28 MG/DL
HDLC SERPL: 5.3 (ref 0–5)
HGB BLD-MCNC: 7.2 G/DL (ref 12.1–17)
LDLC SERPL CALC-MCNC: 72.8 MG/DL (ref 0–100)
MCH RBC QN AUTO: 31.4 PG (ref 26–34)
MCHC RBC AUTO-ENTMCNC: 32 G/DL (ref 30–36.5)
MCV RBC AUTO: 98.3 FL (ref 80–99)
NRBC # BLD: 0 K/UL (ref 0–0.01)
NRBC BLD-RTO: 0 PER 100 WBC
PLATELET # BLD AUTO: 219 K/UL (ref 150–400)
PMV BLD AUTO: 10.8 FL (ref 8.9–12.9)
PROT SERPL-MCNC: 6 G/DL (ref 6.4–8.2)
RBC # BLD AUTO: 2.29 M/UL (ref 4.1–5.7)
SERVICE CMNT-IMP: ABNORMAL
TRIGL SERPL-MCNC: 241 MG/DL
VLDLC SERPL CALC-MCNC: 48.2 MG/DL
WBC # BLD AUTO: 6.6 K/UL (ref 4.1–11.1)

## 2024-03-21 PROCEDURE — 6370000000 HC RX 637 (ALT 250 FOR IP): Performed by: INTERNAL MEDICINE

## 2024-03-21 PROCEDURE — 6370000000 HC RX 637 (ALT 250 FOR IP): Performed by: STUDENT IN AN ORGANIZED HEALTH CARE EDUCATION/TRAINING PROGRAM

## 2024-03-21 PROCEDURE — 76705 ECHO EXAM OF ABDOMEN: CPT

## 2024-03-21 PROCEDURE — 2580000003 HC RX 258: Performed by: INTERNAL MEDICINE

## 2024-03-21 PROCEDURE — 97116 GAIT TRAINING THERAPY: CPT

## 2024-03-21 PROCEDURE — 97112 NEUROMUSCULAR REEDUCATION: CPT

## 2024-03-21 PROCEDURE — 6360000002 HC RX W HCPCS: Performed by: INTERNAL MEDICINE

## 2024-03-21 PROCEDURE — 80061 LIPID PANEL: CPT

## 2024-03-21 PROCEDURE — 97530 THERAPEUTIC ACTIVITIES: CPT | Performed by: OCCUPATIONAL THERAPIST

## 2024-03-21 PROCEDURE — 2060000000 HC ICU INTERMEDIATE R&B

## 2024-03-21 PROCEDURE — 85027 COMPLETE CBC AUTOMATED: CPT

## 2024-03-21 PROCEDURE — 80076 HEPATIC FUNCTION PANEL: CPT

## 2024-03-21 PROCEDURE — 83036 HEMOGLOBIN GLYCOSYLATED A1C: CPT

## 2024-03-21 PROCEDURE — 36415 COLL VENOUS BLD VENIPUNCTURE: CPT

## 2024-03-21 PROCEDURE — 97535 SELF CARE MNGMENT TRAINING: CPT | Performed by: OCCUPATIONAL THERAPIST

## 2024-03-21 PROCEDURE — 82962 GLUCOSE BLOOD TEST: CPT

## 2024-03-21 PROCEDURE — 6370000000 HC RX 637 (ALT 250 FOR IP)

## 2024-03-21 RX ORDER — NADOLOL 40 MG/1
20 TABLET ORAL DAILY
Status: DISCONTINUED | OUTPATIENT
Start: 2024-03-22 | End: 2024-03-22 | Stop reason: HOSPADM

## 2024-03-21 RX ORDER — FLUOXETINE HYDROCHLORIDE 20 MG/1
40 CAPSULE ORAL DAILY
Status: DISCONTINUED | OUTPATIENT
Start: 2024-03-21 | End: 2024-03-22 | Stop reason: HOSPADM

## 2024-03-21 RX ORDER — PREDNISONE 20 MG/1
20 TABLET ORAL DAILY
Status: DISCONTINUED | OUTPATIENT
Start: 2024-03-21 | End: 2024-03-22

## 2024-03-21 RX ORDER — INSULIN LISPRO 100 [IU]/ML
0-4 INJECTION, SOLUTION INTRAVENOUS; SUBCUTANEOUS NIGHTLY
Status: DISCONTINUED | OUTPATIENT
Start: 2024-03-21 | End: 2024-03-22 | Stop reason: HOSPADM

## 2024-03-21 RX ORDER — INSULIN LISPRO 100 [IU]/ML
0-8 INJECTION, SOLUTION INTRAVENOUS; SUBCUTANEOUS
Status: DISCONTINUED | OUTPATIENT
Start: 2024-03-21 | End: 2024-03-22 | Stop reason: HOSPADM

## 2024-03-21 RX ORDER — MIDODRINE HYDROCHLORIDE 5 MG/1
15 TABLET ORAL
Status: DISCONTINUED | OUTPATIENT
Start: 2024-03-21 | End: 2024-03-22 | Stop reason: HOSPADM

## 2024-03-21 RX ORDER — MERCAPTOPURINE 50 MG/1
25 TABLET ORAL DAILY
Status: DISCONTINUED | OUTPATIENT
Start: 2024-03-21 | End: 2024-03-22 | Stop reason: HOSPADM

## 2024-03-21 RX ORDER — GLUCAGON 1 MG/ML
1 KIT INJECTION PRN
Status: DISCONTINUED | OUTPATIENT
Start: 2024-03-21 | End: 2024-03-22 | Stop reason: HOSPADM

## 2024-03-21 RX ORDER — FLUOXETINE HYDROCHLORIDE 20 MG/1
20 CAPSULE ORAL DAILY
Status: DISCONTINUED | OUTPATIENT
Start: 2024-03-22 | End: 2024-03-22 | Stop reason: HOSPADM

## 2024-03-21 RX ORDER — DEXTROSE MONOHYDRATE 100 MG/ML
INJECTION, SOLUTION INTRAVENOUS CONTINUOUS PRN
Status: DISCONTINUED | OUTPATIENT
Start: 2024-03-21 | End: 2024-03-22 | Stop reason: HOSPADM

## 2024-03-21 RX ADMIN — MIDODRINE HYDROCHLORIDE 15 MG: 5 TABLET ORAL at 16:15

## 2024-03-21 RX ADMIN — NADOLOL 40 MG: 40 TABLET ORAL at 09:00

## 2024-03-21 RX ADMIN — INSULIN LISPRO 4 UNITS: 100 INJECTION, SOLUTION INTRAVENOUS; SUBCUTANEOUS at 20:57

## 2024-03-21 RX ADMIN — SODIUM CHLORIDE, PRESERVATIVE FREE 10 ML: 5 INJECTION INTRAVENOUS at 09:11

## 2024-03-21 RX ADMIN — IRON SUCROSE 200 MG: 20 INJECTION, SOLUTION INTRAVENOUS at 16:22

## 2024-03-21 RX ADMIN — INSULIN LISPRO 8 UNITS: 100 INJECTION, SOLUTION INTRAVENOUS; SUBCUTANEOUS at 12:25

## 2024-03-21 RX ADMIN — PREDNISONE 20 MG: 20 TABLET ORAL at 16:15

## 2024-03-21 RX ADMIN — MIDODRINE HYDROCHLORIDE 15 MG: 5 TABLET ORAL at 12:19

## 2024-03-21 RX ADMIN — PANTOPRAZOLE SODIUM 40 MG: 40 TABLET, DELAYED RELEASE ORAL at 05:00

## 2024-03-21 RX ADMIN — ACETAMINOPHEN 650 MG: 325 TABLET ORAL at 07:16

## 2024-03-21 RX ADMIN — INSULIN LISPRO 4 UNITS: 100 INJECTION, SOLUTION INTRAVENOUS; SUBCUTANEOUS at 17:40

## 2024-03-21 RX ADMIN — FLUOXETINE 40 MG: 20 CAPSULE ORAL at 09:10

## 2024-03-21 RX ADMIN — SODIUM CHLORIDE, PRESERVATIVE FREE 10 ML: 5 INJECTION INTRAVENOUS at 20:57

## 2024-03-21 RX ADMIN — INSULIN HUMAN 16 UNITS: 100 INJECTION, SUSPENSION SUBCUTANEOUS at 20:57

## 2024-03-21 RX ADMIN — MERCAPTOPURINE 25 MG: 50 TABLET ORAL at 17:40

## 2024-03-21 RX ADMIN — INSULIN HUMAN 16 UNITS: 100 INJECTION, SUSPENSION SUBCUTANEOUS at 09:01

## 2024-03-21 RX ADMIN — MIDODRINE HYDROCHLORIDE 15 MG: 5 TABLET ORAL at 09:00

## 2024-03-21 ASSESSMENT — PAIN DESCRIPTION - LOCATION: LOCATION: HEAD

## 2024-03-21 ASSESSMENT — PAIN DESCRIPTION - ORIENTATION: ORIENTATION: INNER

## 2024-03-21 ASSESSMENT — PAIN DESCRIPTION - DESCRIPTORS: DESCRIPTORS: ACHING

## 2024-03-21 ASSESSMENT — PAIN SCALES - GENERAL: PAINLEVEL_OUTOF10: 8

## 2024-03-21 NOTE — PLAN OF CARE
Problem: Discharge Planning  Goal: Discharge to home or other facility with appropriate resources  Outcome: Progressing  Flowsheets (Taken 3/20/2024 1934)  Discharge to home or other facility with appropriate resources: Identify barriers to discharge with patient and caregiver     Problem: Pain  Goal: Verbalizes/displays adequate comfort level or baseline comfort level  Outcome: Progressing  Flowsheets (Taken 3/20/2024 1934)  Verbalizes/displays adequate comfort level or baseline comfort level:   Encourage patient to monitor pain and request assistance   Assess pain using appropriate pain scale   Administer analgesics based on type and severity of pain and evaluate response   Implement non-pharmacological measures as appropriate and evaluate response   Notify Licensed Independent Practitioner if interventions unsuccessful or patient reports new pain     Problem: Safety - Adult  Goal: Free from fall injury  Outcome: Progressing  Flowsheets (Taken 3/20/2024 1934)  Free From Fall Injury: Instruct family/caregiver on patient safety     Problem: ABCDS Injury Assessment  Goal: Absence of physical injury  Outcome: Progressing  Flowsheets (Taken 3/20/2024 1934)  Absence of Physical Injury: Implement safety measures based on patient assessment     Problem: Chronic Conditions and Co-morbidities  Goal: Patient's chronic conditions and co-morbidity symptoms are monitored and maintained or improved  Outcome: Progressing  Flowsheets (Taken 3/20/2024 1934)  Care Plan - Patient's Chronic Conditions and Co-Morbidity Symptoms are Monitored and Maintained or Improved: Monitor and assess patient's chronic conditions and comorbid symptoms for stability, deterioration, or improvement

## 2024-03-21 NOTE — PLAN OF CARE
Problem: Physical Therapy - Adult  Goal: By Discharge: Performs mobility at highest level of function for planned discharge setting.  See evaluation for individualized goals.  Description: FUNCTIONAL STATUS PRIOR TO ADMISSION: Pt independent and active PTA. No DME    HOME SUPPORT PRIOR TO ADMISSION: Pt lives alone in [facility] apartment.     Physical Therapy Goals  Initiated 3/20/2024  1.  Patient will move from supine to sit and sit to supine, scoot up and down, and roll side to side in bed with independence within 7 day(s).    2.  Patient will perform sit to stand with supervision/set-up within 7 day(s).  3.  Patient will transfer from bed to chair and chair to bed with supervision/set-up using the least restrictive device within 7 day(s).  4.  Patient will ambulate with supervision/set-up for 150 feet with the least restrictive device within 7 day(s).     Outcome: Progressing   PHYSICAL THERAPY TREATMENT    Patient: Vadim Sotomayor Jr. (78 y.o. male)  Date: 3/21/2024  Diagnosis: Syncope and collapse [R55]  Hyperkalemia [E87.5]  Hyperglycemia [R73.9]  Abnormal brain CT [R90.89]  Anemia, unspecified type [D64.9] Syncope and collapse  Procedure(s) (LRB):  ESOPHAGOGASTRODUODENOSCOPY (N/A) 1 Day Post-Op  Precautions: Fall Risk                      ASSESSMENT:  Patient continues to benefit from skilled PT services and is progressing towards goals. Pt received for PT session seated in chair, eager for mobility. BP 120s/70s seated in chair, sit <> Stand with SBA and no device. Pt BP dropping to 79/52 in standing, asymptomatic. Returned to sitting with BP improving to 110/70s. Donned compression stockings with assistance. BP improving in standing to 105/70s. He walked 200ft during session without device, SBA for safety, no buckling or major LOB noted. 2 standing breaks due to fatigue and mild SOB that resolved quickly. Walden Balance scale performed with pt scoring 37/56 indicating fall risk. Educated on use of SPC upon DC to  Role of Therapy;Plan of Care;Transfer Training;Fall Prevention Strategies  Education Method: Verbal  Barriers to Learning: None  Education Outcome: Verbalized understanding      Hannah Aguayo, PT, DPT, NCS  Minutes: 25

## 2024-03-21 NOTE — PLAN OF CARE
Problem: Occupational Therapy - Adult  Goal: By Discharge: Performs self-care activities at highest level of function for planned discharge setting.  See evaluation for individualized goals.  Description: FUNCTIONAL STATUS PRIOR TO ADMISSION:  Driving yes, living alone in ILF apartment, ambulated with SPC in the community, performed ADLs and IADLS on his own   ,  ,  ,  ,  ,  ,  ,  ,  ,  , Active : Yes     HOME SUPPORT: Patient lived alone in ILF apartment.    Occupational Therapy Goals:  Initiated 3/20/2024  1.  Patient will perform grooming with Modified [place] within 7 day(s).  2.  Patient will perform upper body dressing and lower body dressing with Modified [place] with item retrieval within 7 day(s).  3.  Patient will perform toileting with Modified [place] within 7 day(s).  4.  Patient will perform toilet transfers with Modified [place]  within 7 day(s).  5.  Patient will improve fugl camp score by 3 points within 7 days.   Outcome: Progressing   OCCUPATIONAL THERAPY TREATMENT  Patient: Vadim Sotomayor Jr. (78 y.o. male)  Date: 3/21/2024  Primary Diagnosis: Syncope and collapse [R55]  Hyperkalemia [E87.5]  Hyperglycemia [R73.9]  Abnormal brain CT [R90.89]  Anemia, unspecified type [D64.9]  Procedure(s) (LRB):  ESOPHAGOGASTRODUODENOSCOPY (N/A) 1 Day Post-Op   Precautions: Fall Risk                Chart, occupational therapy assessment, plan of care, and goals were reviewed.    ASSESSMENT  Patient continues to benefit from skilled OT services and is progressing towards goals. Pt was seated at bedside chair upon arrival and reports feeling better.  Pt continues to be orthostatic with sit to stand and reported slight dizziness with no anastacia hose on.  Obtained knee high teds and pt was able to don seated crossed leg with increased time and supervision.  He reports he was told to wear anastacia hose prior to admit, but didn't like they way they looked with shorts on.  Educated on the benefits  day and on proper foot inspection to prevent wounds.         LE Dressing: Contact guard assistance (surgical underwear, bilateral socks and knee high anastacia hose seated to thread)     Pain Ratin/10       Activity Tolerance:   Good and requires rest breaks  Please refer to the flowsheet for vital signs taken during this treatment.    After treatment:   Patient left in no apparent distress sitting up in chair, Call bell within reach, and Updated patient's board on functional status and mobility recommendations    COMMUNICATION/EDUCATION:   The patient's plan of care was discussed with: physical therapist, registered nurse, and patient    Patient Education  Education Given To: Patient  Education Provided: Fall Prevention Strategies;ADL Adaptive Strategies  Education Method: Verbal  Barriers to Learning: None  Education Outcome: Verbalized understanding    Thank you for this referral.  Araceli Johnson OTR/L  Minutes: 23

## 2024-03-21 NOTE — PROGRESS NOTES
GI PROGRESS NOTE  Kevin Altamirano PA-C  [phone] NP in-hospital cell phone M-F until 4:30  After 5pm or on weekends, please call  for physician on call    NAME:Vadim Sotomayor Jr. :1945 MRN:[id number]   ATTG: [unfilled]   PCP: Camacho Castro MD  Date/Time:  3/21/2024 10:12 AM     Primary GI: Dr. 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 T bili 1.2  POA WBC 11.8, HgB 7.3 .7, Plt 255  HgB 7.2 today. Stable. WBC nl.  MRCP 3/14 outpatient  10mm stone distal CBD w/o significant CBD dilation.  No overt bleeding, nsaids, EtOH, or smoking.   EGD 3/21  Impression:    -Grade 1 esophageal varices as two columns from 35-42cm; no stigmata of bleeding noted  -Small sliding hiatal hernia from 42-45cm with evidence of gastropathy in hernia to suggest mechanical trauma such as Eric erosion, but no varices  -Patchy gastric erythema (redness) to suggest gastropathy; biopsied  -Duodenal bulbar erythema, possibly representing duodenitis; biopsied  -The remainder of the duodenum and ampulla appear normal with ready bile efflux noted.      Scope Hx  CLN 2021 - Erythema TI, pancolitis w/ inflammation rectum to ascending CLN. Poon 2. Very friable w/ spontaneous bleeding. Bx TI w/o inflammation. Bx ascending/transverse/descending/rectum all show quiescent colitis  M2A Pill cam 2021- Gastritis, duodenitis, no clear small bowel lesions  EGD 2019 - Irregular Z line, bx w/o metaplasia. Schatzki ring, dilated. Otherwise nl.  Plan:   Continue PPI QD   Nadolol can resume today.   Diet as tolerated.   To be evaluated OP for known CBD stones.  Nothing further to add from a GI standpoint.  GI signing-off.  Please call with any questions.  Thank you for this consult.     Plan discussed w/ Dr. Rios   Subjective:   Discussed with RN events overnight. Patient up

## 2024-03-21 NOTE — DIABETES MGMT
[facility]  DIABETES MANAGEMENT CONSULT    Consulted by Provider for advanced nursing evaluation and care for inpatient blood glucose management.    Evaluation and Action Plan   Vadim Sotomayor is a 78 year old gentleman, with Type 2 Diabetes, JEFFERS induced Cirrhosis with elevated LFTs  CAD s/p CABG, Ulcerative Colitis and CKD who was admitted after a ground level fall with syncope.  Head CT was significant for acute bilateral inferior frontal lobe subdural hematomas without significant edema or mass effect. Initial labs with Glucose 563, anion gap normal, creatinine 1.54, eGFR 46, Alk Phos 422, ALT 80, AST 49, A1C 9.8, hematocrit 24.2 and hemoglobin 7.3.  He was started on an insulin gtt for management of hyperglycemia.  A brain MRI was obtained this admission which was significant for acute CVA of the right inferior cerebellum . He did undergo an EGD with GI which demonstrated grade 1 esophageal varices, small hiatal hernia and patchy gastric erythema.      The [facility] has been consulted to assist in glycemic management and advanced diabetes management assessment this admission.  Mr Sotomayor tells me that he is prescribed 50 units Humulin N HS and 12 units Humalog TID with meals.   I do suspect he may be missing insulin doses PTA and actually doesn't need these higher doses.  He doesn't check his glucose but is eating three meals daily.  Current A1C is elevated at 9.8%.  Hyperglycemia rapidly resolved with IVF and insulin.      Plan to continue 16 units NPH Q 12 hr and will get Lispro corrective dose AC HS. Patient fasting glucose was 216, pre-prandial 103-235 and he did not received corrective dose of insulin.  Today, the patient received 8 units Lispro for a blood glucose reading of 354 along with his schedule NPH of 16 units.  Inpatient BG target is 140-180mg/dl. Will review blood glucose trends and adjust insulin tomorrow.    Management Rationale Action Plan  varices  -Patchy gastric erythema (redness) to suggest gastropathy; biopsied  -Duodenal bulbar erythema, possibly representing duodenitis; biopsied  Cardiology consult for orthostatic hypotension. Brain MRI: MRI brain ordered-small acute infarct in the right inferior cerebellum with associated petechial hemorrhage. Stable thin acute bilateral frontal convexity dural hematomas without significant mass effect. Small acute intraparenchymal hemorrhagic contusions of bilateral inferior frontal lobes left greater than right trace acute intraventricular hemorrhage and occipital horns symmetric T1 hyperdensity in the bilateral globus pallidus most commonly seen with chronic liver disease but can be seen in a variety of metabolic disorder  3/21: Midodrine resume for his orthostatic hypotension  Diabetes History   Type 2 Diabetes  Ambulatory BG management provided by: Endocrinologist Behzad Chavez MD    Diabetes-related Medical History  Acute complications  HHNK  Neurological complications  Peripheral neuropathy  Microvascular disease  Nephropathy  Other conditions     JEFFERS, cirrhosis    Diabetes Medication History  Diabetes drug class Diabetes drug name Additional Comments   Biguanide      Sulfonylureas     DPP4 inhibitors     Thiazolidinediones     SGLT-2 inhibitors     GLP-1 Debora     Insulin Humalon N 50 units Night  Humalog 12 units with meals      Diabetes self-management practices:   Eating pattern   He independently prepares meals  [] Breakfast  Toast with strawberry jam or eggs or cereal  [] Lunch   [place] or [employer] baked potato  [] Dinner   [place] steak with starch and vegetable  [] Beverages  Water, Diet Gingerale  Physical activity pattern   Sedentary   Monitoring pattern   Has a glucometer but not checking his glucose   Taking medications pattern  [] Consistent administration??  [x] Affordable    Social determinants of health impacting diabetes self-management practices    Concerned that you need to know

## 2024-03-21 NOTE — PROGRESS NOTES
End of Shift Note    Bedside shift change report given to TYRESE Kovacs (oncoming nurse) by Daniel Nguyen RN (offgoing nurse).  Report included the following information SBAR, Intake/Output, Recent Results, Med Rec Status, Cardiac Rhythm SR, and Alarm Parameters     Shift worked:  7a-7p     Shift summary and any significant changes:     Restarted all home medications, iron given     Concerns for physician to address:       Zone phone for oncoming shift:          Activity:     Number times ambulated in hallways past shift: 1  Number of times OOB to chair past shift: 2    Cardiac:   Cardiac Monitoring: Yes           Access:  Current line(s): PIV     Genitourinary:   Urinary status: voiding    Respiratory:      Chronic home O2 use?: NO  Incentive spirometer at bedside: N/A       GI:     Current diet:  ADULT DIET; Regular; 4 carb choices (60 gm/meal); Low Fat/Low Chol/High Fiber/SHUN  Passing flatus: YES  Tolerating current diet: YES       Pain Management:   Patient states pain is manageable on current regimen: YES    Skin:     Interventions: specialty bed, float heels, PT/OT consult, limit briefs, internal/external urinary devices, and nutritional support    Patient Safety:  Fall Score:    Interventions: bed/chair alarm, assistive device (walker, cane. etc), gripper socks, pt to call before getting OOB, and stay with me (per policy)       Length of Stay:  Expected LOS: 5  Actual LOS: 2      Daniel Nguyen RN

## 2024-03-21 NOTE — PROGRESS NOTES
Progress Note      3/21/2024 7:41 AM  NAME: Vadim Sotomayor Jr.   MRN:  [id number]   Admit Diagnosis: Syncope and collapse [R55]  Hyperkalemia [E87.5]  Hyperglycemia [R73.9]  Abnormal brain CT [R90.89]  Anemia, unspecified type [D64.9]       Primary Cardiologist: Dr Gupta  Physician Requesting consult: Dr Velez         Assessment:     Syncope  Syncope / Fall and subdural hematoma  Acute infarct of Rt inferior cerebellum   History of orthostatic hypotension, Hypotension on midodrine per pt recently increased to 15 mg TID    anemia  (hemoglobin around 12 2 weeks ago, on admission it is 7.3), history of blood transfusions  Right bundle branch block, left anterior fascicular block  Chest pain, troponin negative  CAD s/p CABG, does report intermittent anginal chest pain, stress test 1 year ago was unremarkable  Insulin-dependent diabetes mellitus with hyperglycemia  History of ulcerative colitis  Acute renal insufficiency  Abnormal LFT           Troponin normal  Hemoglobin 7.3> 7.1  Creatinine 1.54> 1.11     CT head 3/19   1.  New/developing hypodensity within the inferior right cerebellum associated with punctate hemorrhage. This may be due to edema, but new infarct not excluded. Recommend MRI brain. 2.  Right occipital bone fracture overlying the new cerebellar hypodensity. 3.  Stable small bilateral inferior frontal lobe subdural hematomas.   CT  3/181.  Acute bilateral inferior frontal lobe subdural hematomas without significant edema or mass effect. 2.  No intraparenchymal hemorrhage. 3.  No acute fracture.      Echo with EF 40-45% range     Follows with Dr. Gupta with [facility]    MRI:       1. Small acute infarct in the right inferior cerebellum with associated  petechial hemorrhage.  2. Stable thin acute bilateral frontal convexity subdural hematomas without  significant mass effect.  3. Small acute intraparenchymal hemorrhagic contusions in the bilateral inferior  frontal lobes,

## 2024-03-21 NOTE — PROGRESS NOTES
obtain MRI of the brain.  Did not recommend ICU level of care  IV fluids as above  Cardiology consult, reviewed input most likely will need event monitor prior to discharge  Echocardiogram reviewed  PT/OT  Neurocheck     Chest pain in the emergency room  As per ER, \"Patient developed chest pain while in the ED and took some of his own nitroglycerin without letting us know. Repeat EKG with sinus rhythm, left-axis deviation, RBBB, ST changes and TWI in inferior leads \"  Chest pain could be related to symptomatic anemia  Holding of aspirin given low hemoglobin  Holding beta-blockers given soft blood pressure  Chest x-ray with clear lungs  Echocardiogram reviewed  Cardiology consult reviewed     Anemia, macrocytic  History of ulcerative colitis  History of multiple transfusions in the past, last one 8 months ago  Iron deficiency anemia  As per patient, hemoglobin was around 12 just 2 weeks ago.  On admission is 7.3  Patient denies melena/bleeding.  No worsening diarrhea recently.  Patient follows with [facility] for blood transfusions and iron infusions  Check TSH and B12/folate  GI consult  Please obtain prior records  Keep hemoglobin more than 7  Patient agreed to blood transfusion as needed  Iron profile showed iron saturation at 7 serum iron 28 and ferritin is 20  Pharmacy to verify home medications.  Patient stated he is on mercaptopurine.  IV iron ordered, heme-onc consulted  Will resume his prednisone     Elevated LFTs/alk phos  Given presentation with syncope and severe anemia patient probably had hypotension causing some elevation in his LFTs  No prior records for comparison  Continue monitoring  GI already consulted for worsening anemia history of ulcerative colitis  Avoid hypotension  Hemoglobin around 7.2  Status post EGD today which showed   Grade 1 esophageal varices as two columns from 35-42cm; no stigmata of bleeding noted  -Small sliding hiatal hernia from 42-45cm with evidence of gastropathy in hernia to  2.8 2.9  --   --    LABALBU  --   --  2.5*  --   --  2.5* 2.6*   BILITOT  --   --  0.8  --   --  1.2* 2.0*   AST  --   --  42*  --   --  70* 86*   ALT  --   --  68  --   --  73 82*   INR 1.1  --   --   --   --   --   --     < > = values in this interval not displayed.         Signed: Jeet Meza MD

## 2024-03-21 NOTE — PROGRESS NOTES
1900 Bedside and Verbal shift change report given to TYRESE Wilson (oncoming nurse) by TYRESE Bean (offgoing nurse). Report included the following information Nurse Handoff Report, Index, Intake/Output, MAR, Recent Results, and Cardiac Rhythm NSR .     End of Shift Note    Bedside shift change report given to TYRESE Bean (oncoming nurse) by Katie Fatima RN (offgoing nurse).  Report included the following information SBAR, Kardex, Intake/Output, MAR, Recent Results, and Cardiac Rhythm NSR    Shift worked:  1900 - 0700     Shift summary and any significant changes:     PRN Tylenol x1 for c/o headache in AM.     Concerns for physician to address:       Zone phone for oncoming shift:          Activity:     Number times ambulated in hallways past shift: 0  Number of times OOB to chair past shift: 0    Cardiac:   Cardiac Monitoring: Yes           Access:  Current line(s): PIV     Genitourinary:   Urinary status: voiding    Respiratory:      Chronic home O2 use?: NO  Incentive spirometer at bedside: NO       GI:     Current diet:  ADULT DIET; Regular; 4 carb choices (60 gm/meal); Low Fat/Low Chol/High Fiber/SHUN  Passing flatus: YES  Tolerating current diet: YES       Pain Management:   Patient states pain is manageable on current regimen: YES    Skin:     Interventions: float heels, increase time out of bed, and nutritional support    Patient Safety:  Fall Score:    Interventions: bed/chair alarm, gripper socks, and pt to call before getting OOB       Length of Stay:  Expected LOS: 4  Actual LOS: 2      Katie Fatima RN

## 2024-03-21 NOTE — PROGRESS NOTES
Pharmacy Medication Reconciliation     The patient was interviewed regarding current PTA medication list, use and drug allergies;  patient present in room and obtained permission from patient to discuss drug regimen with visitor(s) present. The patient was questioned regarding use of any other inhalers, topical products, over the counter medications, herbal medications, vitamin products or ophthalmic/nasal/otic medication use.     Allergy Update: Patient has no known allergies.    Recommendations/Findings:   The following amendments were made to the patient's active medication list on file at [facility]:   1) Additions: All below    2) Deletions:     3) Changes:       Pertinent Findings: See above    Clarified PTA med list with patient [facility] and [facility] . PTA medication list was corrected to the following:     Prior to Admission Medications   Prescriptions Last Dose Informant   FLUoxetine (PROZAC) 20 MG capsule     Sig: Take 1 capsule by mouth daily   FLUoxetine (PROZAC) 40 MG capsule     Sig: Take 1 capsule by mouth daily   hydrOXYzine HCl (ATARAX) 10 MG tablet     Sig: Take 1 tablet by mouth 2 times daily as needed for Anxiety   insulin NPH (HUMULIN N;NOVOLIN N) 100 UNIT/ML injection vial     Sig: Inject 50 Units into the skin nightly   insulin lispro (HUMALOG) 100 UNIT/ML SOLN injection vial     Sig: Inject 12 Units into the skin 3 times daily (before meals)   loperamide (IMODIUM) 2 MG capsule     Sig: Take 1 capsule by mouth 4 times daily as needed for Diarrhea   mercaptopurine (PURINETHOL) 50 MG chemo tablet     Sig: Take 0.5 tablets by mouth daily   midodrine (PROAMATINE) 10 MG tablet     Sig: Take 1.5 tablets by mouth 3 times daily   nadolol (CORGARD) 20 MG tablet     Sig: Take 1 tablet by mouth daily   nitroGLYCERIN (NITROSTAT) 0.4 MG SL tablet     Sig: Place 1 tablet under the tongue every 5 minutes as needed for Chest pain up to max of 3 total doses. If no relief after 1 dose, call 911.   pantoprazole (PROTONIX)

## 2024-03-21 NOTE — CARE COORDINATION
Transition of Care Plan:    RUR: 14% (Moderate  RUR)  Prior Level of Functioning: Independent in ADLs/IADLs   Disposition: Home with home health  If SNF or IPR: Date FOC offered: na  Date FOC received:   Accepting facility:   Date authorization started with reference number:   Date authorization received and expires:   Follow up appointments: CMA will make an appointment   DME needed: none  Transportation at discharge: The patient needs a ride to his car that is located at [facility]  IM/IMM Medicare/ letter given: 2nd IM upon discharge  Is patient a  and connected with VA? na   If yes, was [place] transfer form completed and VA notified? na  Caregiver Contact: AC (Friend) [phone]   Discharge Caregiver contacted prior to discharge? Will be contacted  Care Conference needed? no  Barriers to discharge: Medical clearance      The  patient demonstrates alertness, effective communication of needs, independence in daily activities, and capable of driving.  The patient lives at [facility] for the age of 55 and [facility]; he does not use any DMEs. The patient stated that he needs a ride to his car that is located at [address] [facility]. He is a car key and apartment victoria.      Trista Vilchis RN  Case Management  [phone]

## 2024-03-21 NOTE — PROGRESS NOTES
BP is more stable with knee high anastacia hose.  Educated pt on the benefits of wear these during the day.        03/21/24 1125 03/21/24 1128 03/21/24 1133   Vital Signs   Pulse 74  --  79   BP (!) 101/56 (!) 79/51 115/62  (knee high anastacia hose on LE)   MAP (Calculated) 71 60 80   BP Location Left upper arm Left upper arm Left upper arm   BP Method Automatic Automatic Automatic   Patient Position Sitting Standing Sitting      03/21/24 1135 03/21/24 1142   Vital Signs   Pulse 76 76   BP (!) 101/57  (knee high anastacia hose on LE) (!) 105/50   MAP (Calculated) 72 68   BP Location Left upper arm Left upper arm   BP Method Automatic Automatic   Patient Position Standing Standing  (after ambulating in [place] and back to room)

## 2024-03-22 VITALS
TEMPERATURE: 97.9 F | WEIGHT: 196 LBS | HEART RATE: 54 BPM | SYSTOLIC BLOOD PRESSURE: 121 MMHG | RESPIRATION RATE: 19 BRPM | BODY MASS INDEX: 27.44 KG/M2 | DIASTOLIC BLOOD PRESSURE: 57 MMHG | OXYGEN SATURATION: 95 % | HEIGHT: 71 IN

## 2024-03-22 LAB
ALBUMIN SERPL-MCNC: 2.7 G/DL (ref 3.5–5)
ALBUMIN/GLOB SERPL: 0.8 (ref 1.1–2.2)
ALP SERPL-CCNC: 441 U/L (ref 45–117)
ALT SERPL-CCNC: 76 U/L (ref 12–78)
ANION GAP SERPL CALC-SCNC: 5 MMOL/L (ref 5–15)
AST SERPL-CCNC: 59 U/L (ref 15–37)
BASOPHILS # BLD: 0 K/UL (ref 0–0.1)
BASOPHILS NFR BLD: 0 % (ref 0–1)
BILIRUB SERPL-MCNC: 2.3 MG/DL (ref 0.2–1)
BUN SERPL-MCNC: 18 MG/DL (ref 6–20)
BUN/CREAT SERPL: 16 (ref 12–20)
CALCIUM SERPL-MCNC: 8.9 MG/DL (ref 8.5–10.1)
CHLORIDE SERPL-SCNC: 104 MMOL/L (ref 97–108)
CO2 SERPL-SCNC: 20 MMOL/L (ref 21–32)
CREAT SERPL-MCNC: 1.14 MG/DL (ref 0.7–1.3)
DIFFERENTIAL METHOD BLD: ABNORMAL
EOSINOPHIL # BLD: 0 K/UL (ref 0–0.4)
EOSINOPHIL NFR BLD: 0 % (ref 0–7)
ERYTHROCYTE [DISTWIDTH] IN BLOOD BY AUTOMATED COUNT: 16 % (ref 11.5–14.5)
EST. AVERAGE GLUCOSE BLD GHB EST-MCNC: 226 MG/DL
GLOBULIN SER CALC-MCNC: 3.5 G/DL (ref 2–4)
GLUCOSE BLD STRIP.AUTO-MCNC: 250 MG/DL (ref 65–117)
GLUCOSE BLD STRIP.AUTO-MCNC: 274 MG/DL (ref 65–117)
GLUCOSE BLD STRIP.AUTO-MCNC: 301 MG/DL (ref 65–117)
GLUCOSE BLD STRIP.AUTO-MCNC: 327 MG/DL (ref 65–117)
GLUCOSE SERPL-MCNC: 254 MG/DL (ref 65–100)
HBA1C MFR BLD: 9.5 % (ref 4–5.6)
HCT VFR BLD AUTO: 24.5 % (ref 36.6–50.3)
HGB BLD-MCNC: 7.5 G/DL (ref 12.1–17)
IMM GRANULOCYTES # BLD AUTO: 0.1 K/UL (ref 0–0.04)
IMM GRANULOCYTES NFR BLD AUTO: 1 % (ref 0–0.5)
LYMPHOCYTES # BLD: 0.3 K/UL (ref 0.8–3.5)
LYMPHOCYTES NFR BLD: 3 % (ref 12–49)
MCH RBC QN AUTO: 30.5 PG (ref 26–34)
MCHC RBC AUTO-ENTMCNC: 30.6 G/DL (ref 30–36.5)
MCV RBC AUTO: 99.6 FL (ref 80–99)
MONOCYTES # BLD: 0.3 K/UL (ref 0–1)
MONOCYTES NFR BLD: 4 % (ref 5–13)
NEUTS SEG # BLD: 7.6 K/UL (ref 1.8–8)
NEUTS SEG NFR BLD: 92 % (ref 32–75)
NRBC # BLD: 0 K/UL (ref 0–0.01)
NRBC BLD-RTO: 0 PER 100 WBC
PLATELET # BLD AUTO: 196 K/UL (ref 150–400)
PMV BLD AUTO: 11.3 FL (ref 8.9–12.9)
POTASSIUM SERPL-SCNC: 5 MMOL/L (ref 3.5–5.1)
PROT SERPL-MCNC: 6.2 G/DL (ref 6.4–8.2)
RBC # BLD AUTO: 2.46 M/UL (ref 4.1–5.7)
RBC MORPH BLD: ABNORMAL
RBC MORPH BLD: ABNORMAL
SERVICE CMNT-IMP: ABNORMAL
SODIUM SERPL-SCNC: 129 MMOL/L (ref 136–145)
WBC # BLD AUTO: 8.3 K/UL (ref 4.1–11.1)

## 2024-03-22 PROCEDURE — 2580000003 HC RX 258: Performed by: INTERNAL MEDICINE

## 2024-03-22 PROCEDURE — 6370000000 HC RX 637 (ALT 250 FOR IP): Performed by: CLINICAL NURSE SPECIALIST

## 2024-03-22 PROCEDURE — 83036 HEMOGLOBIN GLYCOSYLATED A1C: CPT

## 2024-03-22 PROCEDURE — 36415 COLL VENOUS BLD VENIPUNCTURE: CPT

## 2024-03-22 PROCEDURE — 6360000002 HC RX W HCPCS: Performed by: INTERNAL MEDICINE

## 2024-03-22 PROCEDURE — 6370000000 HC RX 637 (ALT 250 FOR IP): Performed by: INTERNAL MEDICINE

## 2024-03-22 PROCEDURE — 92526 ORAL FUNCTION THERAPY: CPT

## 2024-03-22 PROCEDURE — 85025 COMPLETE CBC W/AUTO DIFF WBC: CPT

## 2024-03-22 PROCEDURE — 99231 SBSQ HOSP IP/OBS SF/LOW 25: CPT | Performed by: CLINICAL NURSE SPECIALIST

## 2024-03-22 PROCEDURE — 82962 GLUCOSE BLOOD TEST: CPT

## 2024-03-22 PROCEDURE — 6370000000 HC RX 637 (ALT 250 FOR IP): Performed by: STUDENT IN AN ORGANIZED HEALTH CARE EDUCATION/TRAINING PROGRAM

## 2024-03-22 PROCEDURE — 6370000000 HC RX 637 (ALT 250 FOR IP)

## 2024-03-22 PROCEDURE — 80053 COMPREHEN METABOLIC PANEL: CPT

## 2024-03-22 RX ORDER — INSULIN LISPRO 100 [IU]/ML
8 INJECTION, SOLUTION INTRAVENOUS; SUBCUTANEOUS
Qty: 1 EACH | Refills: 1 | Status: SHIPPED | OUTPATIENT
Start: 2024-03-22 | End: 2024-04-21

## 2024-03-22 RX ORDER — CHOLECALCIFEROL (VITAMIN D3) 125 MCG
1000 CAPSULE ORAL DAILY
Status: DISCONTINUED | OUTPATIENT
Start: 2024-03-22 | End: 2024-03-22 | Stop reason: HOSPADM

## 2024-03-22 RX ORDER — PREDNISONE 20 MG/1
20 TABLET ORAL DAILY
Status: DISCONTINUED | OUTPATIENT
Start: 2024-03-23 | End: 2024-03-22 | Stop reason: HOSPADM

## 2024-03-22 RX ORDER — INSULIN LISPRO 100 [IU]/ML
8 INJECTION, SOLUTION INTRAVENOUS; SUBCUTANEOUS
Status: DISCONTINUED | OUTPATIENT
Start: 2024-03-22 | End: 2024-03-22 | Stop reason: HOSPADM

## 2024-03-22 RX ORDER — MIDODRINE HYDROCHLORIDE 10 MG/1
15 TABLET ORAL 3 TIMES DAILY
Qty: 135 TABLET | Refills: 2 | Status: SHIPPED | OUTPATIENT
Start: 2024-03-22 | End: 2024-06-20

## 2024-03-22 RX ADMIN — MERCAPTOPURINE 25 MG: 50 TABLET ORAL at 10:19

## 2024-03-22 RX ADMIN — INSULIN LISPRO 8 UNITS: 100 INJECTION, SOLUTION INTRAVENOUS; SUBCUTANEOUS at 12:10

## 2024-03-22 RX ADMIN — INSULIN HUMAN 10 UNITS: 100 INJECTION, SUSPENSION SUBCUTANEOUS at 14:05

## 2024-03-22 RX ADMIN — INSULIN LISPRO 6 UNITS: 100 INJECTION, SOLUTION INTRAVENOUS; SUBCUTANEOUS at 09:28

## 2024-03-22 RX ADMIN — MUSCLE RUB CREAM: 100; 150 CREAM TOPICAL at 10:53

## 2024-03-22 RX ADMIN — PANTOPRAZOLE SODIUM 40 MG: 40 TABLET, DELAYED RELEASE ORAL at 08:27

## 2024-03-22 RX ADMIN — INSULIN LISPRO 6 UNITS: 100 INJECTION, SOLUTION INTRAVENOUS; SUBCUTANEOUS at 12:09

## 2024-03-22 RX ADMIN — MIDODRINE HYDROCHLORIDE 15 MG: 5 TABLET ORAL at 17:24

## 2024-03-22 RX ADMIN — FLUOXETINE 40 MG: 20 CAPSULE ORAL at 10:51

## 2024-03-22 RX ADMIN — FLUOXETINE HYDROCHLORIDE 20 MG: 20 CAPSULE ORAL at 10:55

## 2024-03-22 RX ADMIN — PREDNISONE 20 MG: 20 TABLET ORAL at 09:21

## 2024-03-22 RX ADMIN — NADOLOL 20 MG: 40 TABLET ORAL at 10:26

## 2024-03-22 RX ADMIN — MIDODRINE HYDROCHLORIDE 15 MG: 5 TABLET ORAL at 12:07

## 2024-03-22 RX ADMIN — IRON SUCROSE 300 MG: 20 INJECTION, SOLUTION INTRAVENOUS at 10:33

## 2024-03-22 RX ADMIN — MIDODRINE HYDROCHLORIDE 15 MG: 5 TABLET ORAL at 08:27

## 2024-03-22 RX ADMIN — SODIUM CHLORIDE, PRESERVATIVE FREE 10 ML: 5 INJECTION INTRAVENOUS at 10:28

## 2024-03-22 RX ADMIN — INSULIN HUMAN 16 UNITS: 100 INJECTION, SUSPENSION SUBCUTANEOUS at 09:26

## 2024-03-22 RX ADMIN — Medication 1000 MCG: at 10:24

## 2024-03-22 ASSESSMENT — PAIN SCALES - GENERAL: PAINLEVEL_OUTOF10: 0

## 2024-03-22 NOTE — PLAN OF CARE
Problem: Discharge Planning  Goal: Discharge to home or other facility with appropriate resources  3/22/2024 1552 by Tonya Viera RN  Outcome: Progressing  3/22/2024 1153 by Tonya Viera RN  Outcome: Progressing     Problem: Pain  Goal: Verbalizes/displays adequate comfort level or baseline comfort level  3/22/2024 1552 by Tonya Viera RN  Outcome: Progressing  3/22/2024 1153 by Tonya Viera RN  Outcome: Progressing     Problem: Safety - Adult  Goal: Free from fall injury  3/22/2024 1552 by Tonya Viera RN  Outcome: Progressing  3/22/2024 1153 by Tonya Viera RN  Outcome: Progressing     Problem: ABCDS Injury Assessment  Goal: Absence of physical injury  3/22/2024 1552 by Tonya Viera RN  Outcome: Progressing  3/22/2024 1153 by Tonya Viera RN  Outcome: Progressing     Problem: Chronic Conditions and Co-morbidities  Goal: Patient's chronic conditions and co-morbidity symptoms are monitored and maintained or improved  3/22/2024 1552 by Tonya Viera RN  Outcome: Adequate for Discharge  3/22/2024 1153 by Tonya Viera RN  Outcome: Progressing     Problem: SLP Adult - Impaired Swallowing  Goal: By Discharge: Advance to least restrictive diet without signs or symptoms of aspiration for planned discharge setting.  See evaluation for individualized goals.  Description: Speech Pathology Goals  Initiated 3/20/2024     1. Patient will tolerate least restrictive diet without signs of aspiration or decline in respiratory status within 7 days. MET 3/22/2024  3/22/2024 1342 by Justyn Carreon, SLP  Outcome: Completed

## 2024-03-22 NOTE — DISCHARGE SUMMARY
Discharge Summary    Name: Vadim Sotomayor Jr.  [id number]  YOB: 1945 (Age: 78 y.o.)   Date of Admission: 3/18/2024  Date of Discharge: 3/22/2024  Attending Physician: Jeet Meza MD    Discharge Diagnosis:   Hemorrhagic stroke: Acute infract in the right inferior cerebellum with hemorrhagic transformation  Subdural hematoma  Syncopal episode  Uncontrolled hyperglycemia  Hyperkalemia  Insulin-dependent diabetes mellitus  Syncope   Orthostatic hypotension  Chest pain in the emergency room  Anemia, macrocytic  History of ulcerative colitis  History of multiple transfusions in the past, last one 8 months ago  Iron deficiency anemia  Elevated LFTs/alk phos  TANK versus CKD  Depression      Consultations:  IP CONSULT TO NEUROSURGERY  IP CONSULT TO PHARMACY  IP CONSULT TO CARDIOLOGY  IP CONSULT TO GI  IP CONSULT TO DIABETES MANAGEMENT  IP CONSULT TO NEUROLOGY  IP WOUND CARE NURSE CONSULT TO EVAL  IP CONSULT TO PHARMACY  IP CONSULT TO DIABETES MANAGEMENT  IP CONSULT TO HEMATOLOGY  IP CONSULT TO PHARMACY  IP CONSULT TO CASE MANAGEMENT      Brief Admission History/Reason for Admission Per Eldon Hua MD:   CHIEF COMPLAINT: Syncope     HISTORY OF PRESENT ILLNESS:     Vadim Sotomayor is a 78 y.o.  male with PMHx significant for ulcerative colitis, insulin-dependent diabetes mellitus, anemia with multiple blood transfusions in the past among other medical problems.     Patient was at his usual state of health until today, he was at [facility] because of medications and passed out, fell on the ground and hit the back of his head.  Patient did not report any specific symptoms related to the incident of passing out.  [facility] team members gave him 1 or 2 tabs of glucose and upon EMS arrival patient blood sugar was 525.  He was given 1 L of normal saline and was brought into the hospital.    Patient reported that he is on 12 units of Humalog before meals and also takes 50 units of  for sign off to her PCP) :  35 minutes

## 2024-03-22 NOTE — DIABETES MGMT
[facility]  [facility]  DIABETES MANAGEMENT CONSULT    Consulted by Provider for advanced nursing evaluation and care for inpatient blood glucose management.    Evaluation and Action Plan   Vadim Sotomayor is a 78 year old gentleman, with Type 2 Diabetes, JEFFERS induced Cirrhosis with elevated LFTs  CAD s/p CABG, Ulcerative Colitis and CKD who was admitted after a ground level fall with syncope.  Head CT was significant for acute bilateral inferior frontal lobe subdural hematomas without significant edema or mass effect. Initial labs with Glucose 563, anion gap normal, creatinine 1.54, eGFR 46, Alk Phos 422, ALT 80, AST 49, A1C 9.8, hematocrit 24.2 and hemoglobin 7.3.  He was started on an insulin gtt for management of hyperglycemia.  A brain MRI was obtained this admission which was significant for acute CVA of the right inferior cerebellum . He did undergo an EGD with GI which demonstrated grade 1 esophageal varices, small hiatal hernia and patchy gastric erythema.      The [facility] has been consulted to assist in glycemic management and advanced diabetes management assessment this admission.  Mr Sotomayor tells me that he is prescribed 50 units Humulin N HS and 12 units Humalog TID with meals.   I do suspect he may be missing insulin doses PTA and actually doesn't need these higher doses.  He doesn't check his glucose but is eating three meals daily.  Current A1C is elevated at 9.8%.  Hyperglycemia rapidly resolved with IVF and insulin.  He transitioned off with 20 units NPH Q12 and fasting glucose was below goal, 101 this morning and NPH reduced to moderate dose.    Prednisone 20mg daily resumed, this is his chronic dose.  Will need morning NPH adjustment to offset this 12h impact.  Will also start mealtime humalog to offset CHO impact.   Inpatient BG target is 140-180mg/dl.    Management Rationale Action Plan   Medication   Basal needs Underlying diabetes:   Using 0.35 units/kg/day  glucose control   Explored factors facilitating and impeding inpatient management  Explored corrective strategies with patient and responsible inpatient provider   Informed patient of rational for insulin strategy while hospitalized     Nursing Diagnosis 02129 Ineffective Health Management   Nursing Intervention Domain 5258 Decision-making Support   Nursing Interventions Identified diabetes self-management practices impeding diabetes control  Discussed diabetes survival skills related to  Healthy Plate eating plan; given handouts  Role of physical activity in improving insulin sensitivity and action  Procedure for blood glucose monitoring & options for low-cost products  Medications plan at discharge     Billing Code(s)   56995    Before making these care recommendations, I personally reviewed the hospitalization record, including notes, laboratory & diagnostic data and current medications, and examined the patient at the bedside (circumstances permitting) before determining care. More than fifty (50) percent of the time was spent in patient counseling and/or care coordination.  Total minutes: 30    JS Vázquez - CNS  Diabetes Clinical Nurse Specialist  [facility]  Access via [employer]

## 2024-03-22 NOTE — CONSULTS
Hematology Oncology Consultation    Reason for consult: multifactorial anemia    Admitting Diagnosis: Syncope and collapse [R55]  Hyperkalemia [E87.5]  Hyperglycemia [R73.9]  Abnormal brain CT [R90.89]  Anemia, unspecified type [D64.9]    Impression:   *) Multifactorial anemia:  - Follows with my partner, Dr Crespo for multifactorial anemia due to GI blood loss in setting of ulcerative colitis, cirrhosis, b12 def, and possible MDS. Last received IV iron in office Nov 2023.  - Hb 7.5 which is below his baseline. Iron studies show deficiency with ferritin 20 and %sat 7.  - will dose Venofer 300mg today and he had 200mg given yesterday by hospital ist. He is being send home today and will need to FU with Dr Crespo  -continue home oral b12  - FU with GI as outpt for ulcerative colitis/cirrhosis.  - Can consider [facility] as outpt if hb doesn't improved s/p iron supplementation.    *) Ulcerative colitis:  - Follows with Dr Tyler    *) Grade 1 esophageal varices:  - started on nadolol this admission    *) Syncope, CAD s/p CABG, RBBB, Chest pain  *) Cirrhosis due to JEFFERS  *) Right cerebellar CVA with hemorrhagic transformation  - Neurology following    Thank you for this kind referral, we will follow along with you.    Discussion: Vadim Sotomayor Jr. is a  78 y.o. year old seen in consultation at the request of Dr. Meza for iron def anemia. Mr Sotomayor follows with Dr Crespo for multifactorial anemia and was last given IV iron in office in Nov 2023. He was admitted with syncope and fall and noted to have a subdural hematoma, acute CVA with hemorrhagic transformation.  He was noted to have anemia with Hbg ~7 range below his baseline (was 9.5 in office 3/6), had EGD on 3/20/24 showing grade 1 esophageal varices, small hiatal hernia, gastric erythema, and possible duodenitis. His iron labs shows ferritin low at 20 with low %sat of 7, he was given IV iron yesterday by hospitalist. He states he remains on 6-MP for ulcerative  Nightly    labetalol (NORMODYNE;TRANDATE) injection syringe 10 mg  10 mg IntraVENous Q4H PRN    bisacodyl (DULCOLAX) EC tablet 5 mg  5 mg Oral Daily PRN    pantoprazole (PROTONIX) tablet 40 mg  40 mg Oral QAM AC    sodium chloride flush 0.9 % injection 5-40 mL  5-40 mL IntraVENous 2 times per day    0.9 % sodium chloride infusion   IntraVENous PRN    potassium chloride (KLOR-CON M) extended release tablet 40 mEq  40 mEq Oral PRN    Or    potassium bicarb-citric acid (EFFER-K) effervescent tablet 40 mEq  40 mEq Oral PRN    Or    potassium chloride 10 mEq/100 mL IVPB (Peripheral Line)  10 mEq IntraVENous PRN    magnesium sulfate 2000 mg in 50 mL IVPB premix  2,000 mg IntraVENous PRN    ondansetron (ZOFRAN-ODT) disintegrating tablet 4 mg  4 mg Oral Q8H PRN    Or    ondansetron (ZOFRAN) injection 4 mg  4 mg IntraVENous Q6H PRN    polyethylene glycol (GLYCOLAX) packet 17 g  17 g Oral Daily PRN    acetaminophen (TYLENOL) tablet 650 mg  650 mg Oral Q6H PRN    Or    acetaminophen (TYLENOL) suppository 650 mg  650 mg Rectal Q6H PRN    methyl salicylate-menthol (REBEKAH JOYA GREASELESS) 10-15 % cream CREA   Apply externally TID PRN    dextrose bolus 10% 125 mL  125 mL IntraVENous PRN    Or    dextrose bolus 10% 250 mL  250 mL IntraVENous PRN    potassium chloride 10 mEq/100 mL IVPB (Peripheral Line)  10 mEq IntraVENous PRN    sodium phosphate 15 mmol in sodium chloride 0.9 % 250 mL IVPB  15 mmol IntraVENous PRN    dextrose 5 % and 0.45 % sodium chloride infusion   IntraVENous Continuous PRN    lidocaine (LMX) 4 % cream   Topical PRN         Review of Systems:  Constitutional No fevers, chills, night sweats, +excessive fatigue   Allergic/Immunologic No recent allergic reactions   Eyes No significant visual difficulties. No diplopia.   ENMT No problems with hearing, no sore throat, no sinus drainage.   Endocrine No hot flashes or night sweats. No cold intolerance, polyuria, or polydipsia   Hematologic/Lymphatic No easy bruising or

## 2024-03-22 NOTE — PROGRESS NOTES
Progress Note      3/22/2024 8:32 AM  NAME: Vadim Sotomayor Jr.   MRN:  [id number]   Admit Diagnosis: Syncope and collapse [R55]  Hyperkalemia [E87.5]  Hyperglycemia [R73.9]  Abnormal brain CT [R90.89]  Anemia, unspecified type [D64.9]       Primary Cardiologist: Dr Gupta  Physician Requesting consult: Dr Velez         Assessment:     Syncope  Syncope / Fall and subdural hematoma  Acute infarct of Rt inferior cerebellum   History of orthostatic hypotension, Hypotension on midodrine per pt recently increased to 15 mg TID    anemia  (hemoglobin around 12 2 weeks ago, on admission it is 7.3), history of blood transfusions  Right bundle branch block, left anterior fascicular block  Chest pain, troponin negative  CAD s/p CABG, does report intermittent anginal chest pain, stress test 1 year ago was unremarkable  Insulin-dependent diabetes mellitus with hyperglycemia  History of ulcerative colitis  Acute renal insufficiency  Abnormal LFT           Troponin normal  Hemoglobin 7.3> 7.1  Creatinine 1.54> 1.11     CT head 3/19   1.  New/developing hypodensity within the inferior right cerebellum associated with punctate hemorrhage. This may be due to edema, but new infarct not excluded. Recommend MRI brain. 2.  Right occipital bone fracture overlying the new cerebellar hypodensity. 3.  Stable small bilateral inferior frontal lobe subdural hematomas.   CT  3/181.  Acute bilateral inferior frontal lobe subdural hematomas without significant edema or mass effect. 2.  No intraparenchymal hemorrhage. 3.  No acute fracture.      Echo with EF 40-45% range     Follows with Dr. Gupta with [facility]    MRI:       1. Small acute infarct in the right inferior cerebellum with associated  petechial hemorrhage.  2. Stable thin acute bilateral frontal convexity subdural hematomas without  significant mass effect.  3. Small acute intraparenchymal hemorrhagic contusions in the bilateral inferior  frontal lobes,  mEq IntraVENous PRN    sodium phosphate 15 mmol in sodium chloride 0.9 % 250 mL IVPB  15 mmol IntraVENous PRN    dextrose 5 % and 0.45 % sodium chloride infusion   IntraVENous Continuous PRN    lidocaine (LMX) 4 % cream   Topical PRN              Jeff Rose MD

## 2024-03-22 NOTE — PROGRESS NOTES
Bedside shift change report given to TYRESE Jacob and TYRESE Castaneda (oncoming nurse) by TYRESE Kovacs (offgoing nurse). Report included the following information Nurse Handoff Report, Recent Results, and Cardiac Rhythm NSR .      1630--Per cardiology, patient will follow up outpatient with his cardiologist for cardiac event monitor.     1714--Repeat EKG done and Dr. Meza aware of results. Per MD, patient can discharge.    I have reviewed discharge instructions with the patient. The patient verbalized understanding. Discharge medications reviewed with patient and appropriate educational materials and side effects teaching were provided. Follow-up appointments reviewed. Opportunity for questions and clarification was provided.  Venous access removed without difficulty.  Patient's belongings gathered and sent with patient. Patient is ready for discharge. Patient being picked up by ride share.    1740--Patient wheeled to ride.      Nidia Serna RN

## 2024-03-22 NOTE — DIABETES MGMT
[facility]  [facility]  DIABETES MANAGEMENT CONSULT    Consulted by Provider for advanced nursing evaluation and care for inpatient blood glucose management.    Evaluation and Action Plan   Vadim Sotomayor is a 78 year old gentleman, with Type 2 Diabetes, JEFFERS induced Cirrhosis with elevated LFTs  CAD s/p CABG, Ulcerative Colitis and CKD who was admitted after a ground level fall with syncope.  Head CT was significant for acute bilateral inferior frontal lobe subdural hematomas without significant edema or mass effect. Initial labs with Glucose 563, anion gap normal, creatinine 1.54, eGFR 46, Alk Phos 422, ALT 80, AST 49, A1C 9.8, hematocrit 24.2 and hemoglobin 7.3.  He was started on an insulin gtt for management of hyperglycemia.  A brain MRI was obtained this admission which was significant for acute CVA of the right inferior cerebellum . He did undergo an EGD with GI which demonstrated grade 1 esophageal varices, small hiatal hernia and patchy gastric erythema.      The [facility] has been consulted to assist in glycemic management and advanced diabetes management assessment this admission.  Mr Sotomayor tells me that he is prescribed 50 units Humulin N HS and 12 units Humalog TID with meals.   I do suspect he may be missing insulin doses PTA and actually doesn't need these higher doses.  He doesn't check his glucose but is eating three meals daily.  Current A1C is elevated at 9.8%.  Hyperglycemia rapidly resolved with IVF and insulin.      Plan give NPH 26 units with each prednisone in the AM and 16 units NPH  in the evening. Lispro 8 units with meals consumption and corrective dose AC/HS. Patient fasting glucose was 254, pre-prandial 298-354 and received 6 units corrective dose of insulin.  He had a total of 68 units of insulin in 24 hours. Inpatient BG target is 140-180mg/dl.       Management Rationale Action Plan   Medication   Basal needs Using 0.35  NPH order to cover for  administration??  [x] Affordable    Social determinants of health impacting diabetes self-management practices    Concerned that you need to know more about how to stay healthy with diabetes    Overall evaluation:    [x] Not achieving A1c target with drug therapy & self-care practices    Subjective   ”What is an A1c?”     Objective   Physical exam  General Obese male in no acute distress/ill-appearing. Conversant and cooperative  Neuro  Alert, oriented   Vital Signs   Vitals:    24 1145   BP: (!) 105/57   Pulse: 69   Resp: 17   Temp: 97.7 °F (36.5 °C)   SpO2: 95%     Skin  Warm and dry. No acanthosis noted along neckline. No lipohypertrophy or lipoatrophy noted at injection sites. Distended abdomen  Extremities No foot wounds. Scabs.        Laboratory  Recent Labs     24  0409 24  0339 24  0331   WBC 8.3 6.6 8.3   HGB 7.2* 7.2* 7.5*   HCT 23.2* 22.5* 24.5*   MCV 98.7 98.3 99.6*    219 196       Recent Labs     24  1627 24  0409 24  0331   * 133* 129*   K 3.9 4.2 5.0    107 104   CO2 22 20* 20*   PHOS 2.9  --   --    BUN 20 18 18   CREATININE 1.20 1.18 1.14       Lab Results   Component Value Date    ALT 76 2024    AST 59 (H) 2024    ALKPHOS 441 (H) 2024    BILITOT 2.3 (H) 2024     No results found for: \"TSH\", \"TSHREFLEX\", \"TSHFT4\", \"TSHELE\", \"MOM6QLS\", \"TSHHS\"  Lab Results   Component Value Date    LABA1C 9.5 (H) 2024    LABA1C 9.6 (H) 2024    LABA1C 9.8 (H) 2024     Blood glucose pattern      Significant diabetes-related events over the past 24-72 hours  A1C 9.8%  Fasting B  Pre-prandial: 298-354  Basal: NPH 16 units x 2  Bolus: 30  Correction: 6  Total daily insulin dose in the last 24 hours: 68 units    Assessment and Nursing Intervention   Nursing Diagnosis Risk for unstable blood glucose pattern   Nursing Intervention Domain 5406 Decision-making Support   Nursing Interventions Examined current inpatient

## 2024-03-22 NOTE — PLAN OF CARE
Problem: Discharge Planning  Goal: Discharge to home or other facility with appropriate resources  Outcome: Progressing     Problem: Pain  Goal: Verbalizes/displays adequate comfort level or baseline comfort level  Outcome: Progressing     Problem: Safety - Adult  Goal: Free from fall injury  Outcome: Progressing     Problem: Chronic Conditions and Co-morbidities  Goal: Patient's chronic conditions and co-morbidity symptoms are monitored and maintained or improved  Outcome: Progressing     Problem: Occupational Therapy - Adult  Goal: By Discharge: Performs self-care activities at highest level of function for planned discharge setting.  See evaluation for individualized goals.  Description: FUNCTIONAL STATUS PRIOR TO ADMISSION:  Driving yes, living alone in [place] apartment, ambulated with SPC in the community, performed ADLs and IADLS on his own   ,  ,  ,  ,  ,  ,  ,  ,  ,  , Active : Yes     HOME SUPPORT: Patient lived alone in [facility] apartment.    Occupational Therapy Goals:  Initiated 3/20/2024  1.  Patient will perform grooming with Modified [place] within 7 day(s).  2.  Patient will perform upper body dressing and lower body dressing with Modified [place] with item retrieval within 7 day(s).  3.  Patient will perform toileting with Modified [place] within 7 day(s).  4.  Patient will perform toilet transfers with Modified [place]  within 7 day(s).  5.  Patient will improve fugl camp score by 3 points within 7 days.   3/21/2024 1317 by Araceli Johnson, OTR/L  Outcome: Progressing     Problem: Physical Therapy - Adult  Goal: By Discharge: Performs mobility at highest level of function for planned discharge setting.  See evaluation for individualized goals.  Description: FUNCTIONAL STATUS PRIOR TO ADMISSION: Pt independent and active PTA. No DME    HOME SUPPORT PRIOR TO ADMISSION: Pt lives alone in [facility] apartment.     Physical Therapy Goals  Initiated 3/20/2024  1.  Patient will move from

## 2024-03-22 NOTE — PROGRESS NOTES
3:21 PM Received order for event monitor. Per Dr. Rose (cardiologist) , he needs to get event monitor from his Cardiologist Dr. Gupta as an outpatient.

## 2024-03-22 NOTE — PROGRESS NOTES
End of Shift Note    Bedside shift change report given to TYRESE Jacob (oncoming nurse) by Bethanie Harp RN (offgoing nurse).  Report included the following information SBAR, Kardex, ED Summary, Intake/Output, MAR, and Recent Results    Shift worked:  Night     Shift summary and any significant changes:     No acute changes overnight     Concerns for physician to address:      Zone phone for oncoming shift:          Activity:     Number times ambulated in hallways past shift: 0  Number of times OOB to chair past shift: 2    Cardiac:   Cardiac Monitoring: Yes           Access:  Current line(s): PIV     Genitourinary:   Urinary status: voiding    Respiratory:      Chronic home O2 use?: NO  Incentive spirometer at bedside: NO       GI:     Current diet:  ADULT DIET; Regular; 4 carb choices (60 gm/meal); Low Fat/Low Chol/High Fiber/SHUN  Passing flatus: YES  Tolerating current diet: YES       Pain Management:   Patient states pain is manageable on current regimen: YES    Skin:     Interventions: float heels and increase time out of bed    Patient Safety:  Fall Score:    Interventions: bed/chair alarm, gripper socks, pt to call before getting OOB, and stay with me (per policy)       Length of Stay:  Expected LOS: 5  Actual LOS: 3      Bethanie Harp RN

## 2024-03-22 NOTE — PLAN OF CARE
Problem: Discharge Planning  Goal: Discharge to home or other facility with appropriate resources  3/22/2024 1153 by Tonya Viera RN  Outcome: Progressing  3/21/2024 2328 by Bethanie Harp RN  Outcome: Progressing     Problem: Pain  Goal: Verbalizes/displays adequate comfort level or baseline comfort level  3/22/2024 1153 by Tonya Viera RN  Outcome: Progressing  3/21/2024 2328 by Bethanie Harp RN  Outcome: Progressing     Problem: Safety - Adult  Goal: Free from fall injury  3/22/2024 1153 by Tonya Viera RN  Outcome: Progressing  3/21/2024 2328 by Bethanie Harp RN  Outcome: Progressing     Problem: ABCDS Injury Assessment  Goal: Absence of physical injury  Outcome: Progressing     Problem: Chronic Conditions and Co-morbidities  Goal: Patient's chronic conditions and co-morbidity symptoms are monitored and maintained or improved  3/22/2024 1153 by Tonya Viera RN  Outcome: Progressing  3/21/2024 2328 by Bethanie Harp RN  Outcome: Progressing

## 2024-03-22 NOTE — PLAN OF CARE
Speech LAnguage Pathology TREATMENT/DISCHARGE    Patient: Vadim Sotomayor Jr. (78 y.o. male)  Date: 3/22/2024  Primary Diagnosis: Syncope and collapse [R55]  Hyperkalemia [E87.5]  Hyperglycemia [R73.9]  Abnormal brain CT [R90.89]  Anemia, unspecified type [D64.9]  Procedure(s) (LRB):  ESOPHAGOGASTRODUODENOSCOPY (N/A) 2 Days Post-Op   Precautions: Fall Risk                  ASSESSMENT :  Patient seen in follow up on this date. Patient and RN report continued good tolerance of PO intake. Patient observed with lunch tray, which he self fed independently. Patient continues with oral phase assessed to be WFL. No overt difficulty or overt signs of aspiration observed with any consistency trialed. Will completed SLP orders at this time. Please re-consult if needs arise.    Patient will be discharged from skilled speech-language pathology services at this time.     PLAN :  Recommendations and Planned Interventions:  Diet: Regular and thin liquids  -- Medication as tolerated  -- Routine oral care  -- Aspiration precautions: upright positioning for all PO, small bites/sips, slow rate of intake, single sips (1 at a time), alternate liquids and solids as needed       Acute SLP Services: No, patient will be discharged from acute skilled speech-language pathology at this time.    Discharge Recommendations: No, additional SLP treatment not indicated at discharge     SUBJECTIVE:   Patient stated, “I remember you.”    OBJECTIVE:     Past Medical History:   Diagnosis Date    Diabetes mellitus (HCC)     Ulcerative colitis (HCC)      Past Surgical History:   Procedure Laterality Date    HEMORRHOID SURGERY      TOTAL HIP ARTHROPLASTY      UPPER GASTROINTESTINAL ENDOSCOPY N/A 3/20/2024    ESOPHAGOGASTRODUODENOSCOPY performed by Moe Rios MD at [facility] ENDOSCOPY     Prior Level of Function/Home Situation:   Social/Functional History  Lives With: Alone  Type of Home: Apartment ([facility])  Home Layout:  (on the

## 2024-03-22 NOTE — CARE COORDINATION
Transition of Care Plan:    RUR: 14% (Moderate  RUR)  Prior Level of Functioning: Independent in ADLs/IADLs   Disposition: Home with home health [place] [phone]  If SNF or IPR: Date FOC offered: na  Date FOC received:   Accepting facility:   Date authorization started with reference number:   Date authorization received and expires:   Follow up appointments: CMA will make an appointment   DME needed: has a cane  Transportation at discharge: The patient needs a ride [place] [address]  IM/IMM Medicare/ letter given: 2nd IM letter 03/22/2024  Is patient a  and connected with [facility]? na   If yes, was [place] transfer form completed and [facility] notified? na  Caregiver Contact: KALYAN (Friend) [phone]   Discharge Caregiver contacted prior to discharge? Will be contacted  Care Conference needed? no  Barriers to discharge: none    [place] [address] at 5:30 per the primary RN Nidia request.     The  (CM) conducted a meeting with the patient, who indicated no immediate needs or concerns and concurred with the discharge plan. It was established that the patient needs a rideshare to be arranged transportation upon discharge to his car that is located at [facility], [address] [place] [address]. Subsequently, the primary Registered Nurse (RN) was duly notified of the patient's clearance from the CM's standpoint. The patient agreed to receive home health but provided no preference. A referral was sent to [place] [phone] with SOC 03/23/2024.    The  patient demonstrates alertness, effective communication of needs, independence in daily activities, and capable of driving.  The patient lives at [facility] for the age of 55 and up; he does not use any DMEs.

## 2024-03-22 NOTE — DISCHARGE INSTRUCTIONS
DISCHARGE DIAGNOSIS:  Hemorrhagic stroke: Acute infract in the right inferior cerebellum with hemorrhagic transformation  Subdural hematoma  Syncopal episode  Uncontrolled hyperglycemia  Hyperkalemia  Insulin-dependent diabetes mellitus  Syncope   Orthostatic hypotension  Chest pain in the emergency room  Anemia, macrocytic  History of ulcerative colitis  History of multiple transfusions in the past, last one 8 months ago  Iron deficiency anemia  Elevated LFTs/alk phos  TANK versus CKD  Depression      MEDICATIONS:  It is important that you take the medication exactly as they are prescribed.   Keep your medication in the bottles provided by the pharmacist and keep a list of the medication names, dosages, and times to be taken in your wallet.   Do not take other medications without consulting your doctor.     Pain Management: per above medications    What to do at Home    Recommended diet:  {diet:58539}    Recommended activity: {discharge activity:88754}    If you have questions regarding the hospital related prescriptions or hospital related issues please call [facility] at . You can always direct your questions to your primary care doctor if you are unable to reach your hospital physician; your PCP works as an extension of your hospital doctor just like your hospital doctor is an extension of your PCP for your time at the hospital ([facility]).    If you experience any of the following symptoms then please call your primary care physician or return to the emergency room if you cannot get hold of your doctor:  Fever, chills, nausea, vomiting, diarrhea, change in mentation, falling, bleeding, shortness of breath, ***

## 2024-03-23 LAB
EKG ATRIAL RATE: 58 BPM
EKG DIAGNOSIS: NORMAL
EKG P AXIS: 83 DEGREES
EKG P-R INTERVAL: 168 MS
EKG Q-T INTERVAL: 484 MS
EKG QRS DURATION: 134 MS
EKG QTC CALCULATION (BAZETT): 475 MS
EKG R AXIS: -55 DEGREES
EKG T AXIS: 192 DEGREES
EKG VENTRICULAR RATE: 58 BPM

## 2024-05-10 ENCOUNTER — TRANSCRIBE ORDERS (OUTPATIENT)
Facility: HOSPITAL | Age: 79
End: 2024-05-10

## 2024-05-10 DIAGNOSIS — R18.8 ASCITES OF LIVER: Primary | ICD-10-CM

## 2024-05-24 ENCOUNTER — HOSPITAL ENCOUNTER (OUTPATIENT)
Facility: HOSPITAL | Age: 79
End: 2024-05-24
Payer: MEDICARE

## 2024-05-24 VITALS
OXYGEN SATURATION: 100 % | TEMPERATURE: 98.3 F | DIASTOLIC BLOOD PRESSURE: 67 MMHG | RESPIRATION RATE: 20 BRPM | SYSTOLIC BLOOD PRESSURE: 112 MMHG | HEART RATE: 76 BPM

## 2024-05-24 DIAGNOSIS — R18.8 ASCITES OF LIVER: ICD-10-CM

## 2024-05-24 LAB
COMMENT:: NORMAL
SPECIMEN HOLD: NORMAL

## 2024-05-24 PROCEDURE — 49083 ABD PARACENTESIS W/IMAGING: CPT

## 2024-05-24 PROCEDURE — 2500000003 HC RX 250 WO HCPCS: Performed by: GENERAL ACUTE CARE HOSPITAL

## 2024-05-24 RX ORDER — LIDOCAINE HYDROCHLORIDE 10 MG/ML
10 INJECTION, SOLUTION EPIDURAL; INFILTRATION; INTRACAUDAL; PERINEURAL ONCE
Status: COMPLETED | OUTPATIENT
Start: 2024-05-24 | End: 2024-05-24

## 2024-05-24 RX ADMIN — LIDOCAINE HYDROCHLORIDE 10 ML: 10 INJECTION, SOLUTION EPIDURAL; INFILTRATION; INTRACAUDAL; PERINEURAL at 12:56

## 2024-05-24 NOTE — DISCHARGE INSTRUCTIONS
[facility]  Radiology Department  [phone]    Angela Brennan    Date:  5/24/2024    Paracentesis Discharge Instructions    You may have an aching pain in your abdomen at the puncture site tonight as the numbing medicine wears off.   You may take Tylenol if allowed, as directed on the label.      Resume your previous diet and prescribed medications.      Rest the remainder of today.  If we have removed a large volume of fluid, you could be lightheaded or dizzy when making position changes.      You may shower in 24 hours.  Keep your dressing clean and dry.  You may replace the dressing or band-aid if it becomes moist or soiled.      Watch for signs of infection at the puncture site:  redness, swelling, pus, fever or chills.  Should any of of these occur contact your physician immediately.       If you experience severe sweating and new, severe abdominal pain seek emergency medical treatment.      If any lab samples were sent during your procedure today the resutls will be sent to your Physician.      Follow up with your physician as previously planned.      If you have any questions please call and ask to speak to a radiology nurse.

## 2024-06-13 ENCOUNTER — HOSPITAL ENCOUNTER (OUTPATIENT)
Facility: HOSPITAL | Age: 79
Discharge: HOME | End: 2024-06-13
Payer: MEDICARE

## 2024-06-13 VITALS
SYSTOLIC BLOOD PRESSURE: 110 MMHG | OXYGEN SATURATION: 99 % | HEART RATE: 60 BPM | DIASTOLIC BLOOD PRESSURE: 50 MMHG | RESPIRATION RATE: 20 BRPM | TEMPERATURE: 98.5 F

## 2024-06-13 DIAGNOSIS — R18.8 ASCITES OF LIVER: ICD-10-CM

## 2024-06-13 LAB
COMMENT:: NORMAL
SPECIMEN HOLD: NORMAL

## 2024-06-13 PROCEDURE — 2709999900 US GUIDED PARACENTESIS

## 2024-06-13 PROCEDURE — 6360000002 HC RX W HCPCS: Performed by: RADIOLOGY

## 2024-06-13 PROCEDURE — P9047 ALBUMIN (HUMAN), 25%, 50ML: HCPCS | Performed by: RADIOLOGY

## 2024-06-13 RX ORDER — ALBUMIN (HUMAN) 12.5 G/50ML
25 SOLUTION INTRAVENOUS ONCE
Status: COMPLETED | OUTPATIENT
Start: 2024-06-13 | End: 2024-06-13

## 2024-06-13 RX ADMIN — ALBUMIN (HUMAN) 25 G: 0.25 INJECTION, SOLUTION INTRAVENOUS at 09:00

## 2024-06-13 ASSESSMENT — PAIN - FUNCTIONAL ASSESSMENT: PAIN_FUNCTIONAL_ASSESSMENT: NONE - DENIES PAIN

## 2024-06-13 NOTE — PROGRESS NOTES
Procedure reviewed with patient by Suly Mcknight-JEWELS. Opportunity to verbalize questions and concerns.  Consent obtained.

## 2024-07-16 ENCOUNTER — TRANSCRIBE ORDERS (OUTPATIENT)
Facility: HOSPITAL | Age: 79
End: 2024-07-16

## 2024-07-16 DIAGNOSIS — R18.8 ASCITES OF LIVER: Primary | ICD-10-CM

## 2024-07-19 ENCOUNTER — HOSPITAL ENCOUNTER (OUTPATIENT)
Facility: HOSPITAL | Age: 79
End: 2024-07-19
Payer: MEDICARE

## 2024-07-19 VITALS
SYSTOLIC BLOOD PRESSURE: 117 MMHG | HEART RATE: 62 BPM | BODY MASS INDEX: 27.73 KG/M2 | OXYGEN SATURATION: 96 % | RESPIRATION RATE: 16 BRPM | HEIGHT: 71 IN | TEMPERATURE: 98.5 F | DIASTOLIC BLOOD PRESSURE: 54 MMHG

## 2024-07-19 DIAGNOSIS — R18.8 ASCITES OF LIVER: ICD-10-CM

## 2024-07-19 LAB
COMMENT:: NORMAL
SPECIMEN HOLD: NORMAL

## 2024-07-19 PROCEDURE — 6360000002 HC RX W HCPCS: Performed by: STUDENT IN AN ORGANIZED HEALTH CARE EDUCATION/TRAINING PROGRAM

## 2024-07-19 PROCEDURE — P9047 ALBUMIN (HUMAN), 25%, 50ML: HCPCS | Performed by: STUDENT IN AN ORGANIZED HEALTH CARE EDUCATION/TRAINING PROGRAM

## 2024-07-19 PROCEDURE — 2709999900 US GUIDED PARACENTESIS

## 2024-07-19 RX ORDER — HEPARIN 100 UNIT/ML
500 SYRINGE INTRAVENOUS PRN
Status: DISCONTINUED | OUTPATIENT
Start: 2024-07-19 | End: 2024-07-23 | Stop reason: HOSPADM

## 2024-07-19 RX ORDER — HEPARIN SODIUM,PORCINE/PF 10 UNIT/ML
3 SYRINGE (ML) INTRAVENOUS PRN
Status: DISCONTINUED | OUTPATIENT
Start: 2024-07-19 | End: 2024-07-19

## 2024-07-19 RX ORDER — ALBUMIN (HUMAN) 12.5 G/50ML
25 SOLUTION INTRAVENOUS ONCE
Status: COMPLETED | OUTPATIENT
Start: 2024-07-19 | End: 2024-07-19

## 2024-07-19 RX ADMIN — ALBUMIN (HUMAN) 25 G: 0.25 INJECTION, SOLUTION INTRAVENOUS at 08:43

## 2024-07-19 RX ADMIN — Medication 500 UNITS: at 08:47

## 2024-07-19 ASSESSMENT — PAIN - FUNCTIONAL ASSESSMENT
PAIN_FUNCTIONAL_ASSESSMENT: NONE - DENIES PAIN
PAIN_FUNCTIONAL_ASSESSMENT: NONE - DENIES PAIN

## 2024-07-19 NOTE — DISCHARGE INSTRUCTIONS
[facility]  Department of Interventional Radiology  [address], [place] [id number] [phone]    PARACENTESIS DISCHARGE EDUCATION      Radiologist:   Ramandeep Ruffin NP     Date:   July 19, 2024      Information:   Paracentesis is a procedure to remove extra fluid from your belly (abdomen). This fluid buildup in the abdomen is called ascites. The procedure may have been done to take a sample of the fluid. Or, it may have been done to drain the extra fluid from your abdomen and help make you more comfortable.      What should I expect after the Paracentesis?    Expect some slight soreness at the site where the catheter was inserted. To relieve pain, take Tylenol (acetaminophen) or the pain medicine your doctor prescribed. Avoid ibuprofen (Advil, Motrin) and aspirin as they may cause you to bleed.   You will have a small bandage over the puncture site. Stitches, surgical staples, adhesive tapes, adhesive strips, or surgical glue may be used to close the cut (incision). They also help stop bleeding and speed healing. You may take the bandage off in 24 hours.   Do not lift anything greater than 5 pounds for 24 hours   It is Normal to experience slight bleeding or drainage after the procedure.      Bathing & Wound Care:    Remove the small bandages on your incision after 24 to 48 hours or as directed by your doctor.    You may shower after 24 hours; do not submerge in water for a minimum of 3 days (bath tub, hot tub, swimming pool, river or any other body of water).     Follow-up visit information:   Follow up with Interventional Radiology is not routinely necessary.     Occasionally, a situation will require prompt attention and to call your Primary Care Physician:    Swelling    Excessive Redness    Continued/ Excessive Drainage    Bright Red Continuous Bleeding    Increased Prolonged Pain    Fever of 100.4° or greater      If you have any questions or concerns, please  No

## 2024-07-19 NOTE — PROGRESS NOTES
Name of Procedure: Paracentesis     Start: 815 am   End: 840 am     Vital Signs:  stable     Fluids Removed: 4.5 L     Samples sent to lab: hold cup     Any complications related to procedure: none identified at this time

## 2024-08-16 ENCOUNTER — HOSPITAL ENCOUNTER (OUTPATIENT)
Facility: HOSPITAL | Age: 79
End: 2024-08-16
Attending: STUDENT IN AN ORGANIZED HEALTH CARE EDUCATION/TRAINING PROGRAM
Payer: MEDICARE

## 2024-08-16 VITALS
HEART RATE: 53 BPM | DIASTOLIC BLOOD PRESSURE: 60 MMHG | TEMPERATURE: 97.4 F | RESPIRATION RATE: 18 BRPM | OXYGEN SATURATION: 98 % | SYSTOLIC BLOOD PRESSURE: 132 MMHG

## 2024-08-16 DIAGNOSIS — R18.8 ASCITES OF LIVER: ICD-10-CM

## 2024-08-16 PROCEDURE — 76705 ECHO EXAM OF ABDOMEN: CPT

## 2024-08-16 NOTE — PROGRESS NOTES
0825    Pt arrived to XR recovery for  Paracentesis. Pt is A/O x4 with no complaints of pain. VS to be collected and consent to be obtained. Pt bed is locked and in lowest position and call bell is within reach.    0840    US at bedside    0900    KASSIDY Reyes NP at bedside reviewing imaging and speaking with pt. Will hold off of procedure for now dur to lack of fluid and will reassess at pt next appt. Pt informed if he becomes uncomfortable he can reschedule appt. Pt in agreement.    0905    Pt ambulated back to waiting area.

## 2024-08-22 RX ORDER — M-VIT,TX,IRON,MINS/CALC/FOLIC 27MG-0.4MG
1 TABLET ORAL DAILY
COMMUNITY

## 2024-08-23 ENCOUNTER — ANESTHESIA (OUTPATIENT)
Facility: HOSPITAL | Age: 79
End: 2024-08-23
Payer: MEDICARE

## 2024-08-23 ENCOUNTER — ANESTHESIA EVENT (OUTPATIENT)
Facility: HOSPITAL | Age: 79
End: 2024-08-23
Payer: MEDICARE

## 2024-08-23 ENCOUNTER — HOSPITAL ENCOUNTER (OUTPATIENT)
Facility: HOSPITAL | Age: 79
Setting detail: OUTPATIENT SURGERY
Discharge: HOME | End: 2024-08-23
Attending: STUDENT IN AN ORGANIZED HEALTH CARE EDUCATION/TRAINING PROGRAM | Admitting: STUDENT IN AN ORGANIZED HEALTH CARE EDUCATION/TRAINING PROGRAM
Payer: MEDICARE

## 2024-08-23 VITALS
OXYGEN SATURATION: 96 % | SYSTOLIC BLOOD PRESSURE: 114 MMHG | RESPIRATION RATE: 17 BRPM | DIASTOLIC BLOOD PRESSURE: 73 MMHG | BODY MASS INDEX: 24.22 KG/M2 | TEMPERATURE: 97.1 F | HEIGHT: 71 IN | WEIGHT: 173 LBS | HEART RATE: 61 BPM

## 2024-08-23 PROCEDURE — 2709999900 HC NON-CHARGEABLE SUPPLY: Performed by: STUDENT IN AN ORGANIZED HEALTH CARE EDUCATION/TRAINING PROGRAM

## 2024-08-23 PROCEDURE — 88305 TISSUE EXAM BY PATHOLOGIST: CPT

## 2024-08-23 PROCEDURE — 3600007512: Performed by: STUDENT IN AN ORGANIZED HEALTH CARE EDUCATION/TRAINING PROGRAM

## 2024-08-23 PROCEDURE — 2720000010 HC SURG SUPPLY STERILE: Performed by: STUDENT IN AN ORGANIZED HEALTH CARE EDUCATION/TRAINING PROGRAM

## 2024-08-23 PROCEDURE — 3700000001 HC ADD 15 MINUTES (ANESTHESIA): Performed by: STUDENT IN AN ORGANIZED HEALTH CARE EDUCATION/TRAINING PROGRAM

## 2024-08-23 PROCEDURE — 7100000011 HC PHASE II RECOVERY - ADDTL 15 MIN: Performed by: STUDENT IN AN ORGANIZED HEALTH CARE EDUCATION/TRAINING PROGRAM

## 2024-08-23 PROCEDURE — 6360000002 HC RX W HCPCS: Performed by: NURSE ANESTHETIST, CERTIFIED REGISTERED

## 2024-08-23 PROCEDURE — 2580000003 HC RX 258: Performed by: NURSE ANESTHETIST, CERTIFIED REGISTERED

## 2024-08-23 PROCEDURE — 7100000010 HC PHASE II RECOVERY - FIRST 15 MIN: Performed by: STUDENT IN AN ORGANIZED HEALTH CARE EDUCATION/TRAINING PROGRAM

## 2024-08-23 PROCEDURE — 2580000003 HC RX 258: Performed by: STUDENT IN AN ORGANIZED HEALTH CARE EDUCATION/TRAINING PROGRAM

## 2024-08-23 PROCEDURE — 2500000003 HC RX 250 WO HCPCS: Performed by: NURSE ANESTHETIST, CERTIFIED REGISTERED

## 2024-08-23 PROCEDURE — 3600007502: Performed by: STUDENT IN AN ORGANIZED HEALTH CARE EDUCATION/TRAINING PROGRAM

## 2024-08-23 PROCEDURE — 3700000000 HC ANESTHESIA ATTENDED CARE: Performed by: STUDENT IN AN ORGANIZED HEALTH CARE EDUCATION/TRAINING PROGRAM

## 2024-08-23 DEVICE — CLIP
Type: IMPLANTABLE DEVICE | Site: RECTUM | Status: FUNCTIONAL
Brand: RESOLUTION 360™ ULTRA CLIP

## 2024-08-23 RX ORDER — PHENYLEPHRINE HCL IN 0.9% NACL 0.4MG/10ML
SYRINGE (ML) INTRAVENOUS PRN
Status: DISCONTINUED | OUTPATIENT
Start: 2024-08-23 | End: 2024-08-23 | Stop reason: SDUPTHER

## 2024-08-23 RX ORDER — 0.9 % SODIUM CHLORIDE 0.9 %
INTRAVENOUS SOLUTION INTRAVENOUS PRN
Status: DISCONTINUED | OUTPATIENT
Start: 2024-08-23 | End: 2024-08-23 | Stop reason: SDUPTHER

## 2024-08-23 RX ORDER — SODIUM CHLORIDE 0.9 % (FLUSH) 0.9 %
5-40 SYRINGE (ML) INJECTION EVERY 12 HOURS SCHEDULED
Status: DISCONTINUED | OUTPATIENT
Start: 2024-08-23 | End: 2024-08-23 | Stop reason: HOSPADM

## 2024-08-23 RX ORDER — SODIUM CHLORIDE 9 MG/ML
INJECTION, SOLUTION INTRAVENOUS CONTINUOUS PRN
Status: COMPLETED | OUTPATIENT
Start: 2024-08-23 | End: 2024-08-23

## 2024-08-23 RX ORDER — SODIUM CHLORIDE 9 MG/ML
25 INJECTION, SOLUTION INTRAVENOUS PRN
Status: DISCONTINUED | OUTPATIENT
Start: 2024-08-23 | End: 2024-08-23 | Stop reason: HOSPADM

## 2024-08-23 RX ORDER — SODIUM CHLORIDE 0.9 % (FLUSH) 0.9 %
5-40 SYRINGE (ML) INJECTION PRN
Status: DISCONTINUED | OUTPATIENT
Start: 2024-08-23 | End: 2024-08-23 | Stop reason: HOSPADM

## 2024-08-23 RX ORDER — EPHEDRINE SULFATE/0.9% NACL/PF 50 MG/5 ML
SYRINGE (ML) INTRAVENOUS PRN
Status: DISCONTINUED | OUTPATIENT
Start: 2024-08-23 | End: 2024-08-23 | Stop reason: SDUPTHER

## 2024-08-23 RX ORDER — SODIUM CHLORIDE 9 MG/ML
INJECTION, SOLUTION INTRAVENOUS CONTINUOUS
Status: DISCONTINUED | OUTPATIENT
Start: 2024-08-23 | End: 2024-08-23 | Stop reason: HOSPADM

## 2024-08-23 RX ADMIN — PROPOFOL 120 MG: 10 INJECTION, EMULSION INTRAVENOUS at 09:57

## 2024-08-23 RX ADMIN — PROPOFOL 100 MG: 10 INJECTION, EMULSION INTRAVENOUS at 09:38

## 2024-08-23 RX ADMIN — LIDOCAINE HYDROCHLORIDE 100 MG: 20 INJECTION, SOLUTION EPIDURAL; INFILTRATION; INTRACAUDAL; PERINEURAL at 09:38

## 2024-08-23 RX ADMIN — SODIUM CHLORIDE 700 ML: 9 INJECTION, SOLUTION INTRAVENOUS at 10:06

## 2024-08-23 RX ADMIN — PROPOFOL 80 MG: 10 INJECTION, EMULSION INTRAVENOUS at 09:48

## 2024-08-23 RX ADMIN — Medication 100 MCG: at 09:57

## 2024-08-23 RX ADMIN — PROPOFOL 100 MG: 10 INJECTION, EMULSION INTRAVENOUS at 09:44

## 2024-08-23 RX ADMIN — Medication 100 MCG: at 09:49

## 2024-08-23 RX ADMIN — Medication 5 MG: at 10:06

## 2024-08-23 ASSESSMENT — PAIN - FUNCTIONAL ASSESSMENT: PAIN_FUNCTIONAL_ASSESSMENT: NONE - DENIES PAIN

## 2024-08-23 NOTE — OP NOTE
NAME:  Vadim Sotomayor Jr.   :   1945   MRN:   [id number]     Date/Time:  2024 10:08 AM    Colonoscopy Operative Report    Procedure Type:   Colonoscopy with polypectomy (cold snare), polypectomy (cold biopsy)     Indications:    hx of pancolitis, UC  Pre-operative Diagnosis: see indication above  Post-operative Diagnosis:  See findings below  :  BRIDGETT Mcgregor D.O.  Referring Provider: -Camacho Castro MD    Exam:  Airway: clear, no airway problems anticipated  Heart: RRR, without gallops or rubs  Lungs: clear bilaterally without wheezes, crackles, or rhonchi  Abdomen: soft, nontender, nondistended, bowel sounds present  Mental Status: awake, alert and oriented to person, place and time    Sedation:  MAC anesthesia Propofol  Procedure Details:  After informed consent was obtained with all risks and benefits of procedure explained and preoperative exam completed, the patient was taken to the endoscopy suite and placed in the left lateral decubitus position.  Upon sequential sedation as per above, a digital rectal exam was performed which was normal.  The Olympus videocolonoscope  was inserted in the rectum and carefully advanced to the terminal ileum w/ identification of the ileocecal valve and appendiceal orifice.   The quality of preparation was good.  The colonoscope was slowly withdrawn with careful evaluation between folds. Retroflexion in the rectum was completed demonstrating internal hemorrhoids.     Findings:   Anus/Emily-anal exam:  Normal  Rectum:       -Protruding lesions:     -Internal Hemorrhoids - grade III  11 mm polyp removed, complete resection and retrieval en bloc via hot snare polypectomy.   1 TTS clip applied  Poon 1 endo score colitis. Friable tissue, spontaneous bleeding with contact & biopsy.     Sigmoid:   Poon 1 endo score colitis  Descending Colon:   Poon 1 endo score colitis  Transverse Colon:   Poon 1 endo score colitis  Ascending Colon:   Normal  endoscopic appearance- biopsied  Cecum:   Normal endoscopic appearance- biopsied  Terminal Ileum:   Normal endoscopic appearance- biopsied    Specimen Removed:    ID Type Source Tests Collected by Time Destination   1 : Terminal ileum BX Tissue Tissue SURGICAL PATHOLOGY Ximena Mcgregor, DO 8/23/2024 0954    2 : Right colon BX Tissue Tissue SURGICAL PATHOLOGY Ximena Mcgregor, DO 8/23/2024 0955    3 : Transverse colon BX Tissue Tissue SURGICAL PATHOLOGY Ximena Mcgregor, DO 8/23/2024 0955    4 : Left colon BX Tissue Tissue SURGICAL PATHOLOGY Ximena Mcgregor, DO 8/23/2024 0957    5 : Rectal polyp Tissue Tissue SURGICAL PATHOLOGY Ximena Mcgregor, DO 8/23/2024 1000    6 : Rectal BX Tissue Tissue SURGICAL PATHOLOGY Ximena Mcgregor, DO 8/23/2024 1005        Complications: None.   EBL:  None.    Impression:    Overall, normal TI & ascending colon (endoscopically). Biopsied in TI & R-colon.  Poon 1 score colitis for transverse/descending/sigmoid/rectum. Biopsied (jars labeled as Transverse,L-colon, rectum).  One flat polyp seen in rectum (roughly 15cm from anal verge)- complete resection and retrieval en bloc via hot snare polypectomy. One TTS clip placed  Significant signs of mucosal scarring seen throughout colon consistent with UC.   See above for full details  Second look/retroflexion performed in right colon.  Otherwise, unremarkable.     Recommendations:   - Resume regular diet  - Resume normal medications. Avoid NSAID medications for 48 hours. Avoid any blood thinners for 48 hours such as eliquis, xarelto, etc.   - IF biopsies were taken or polyps were removed, await pathology results. These should be available within the next 3 weeks- via a letter mailed to you for your records. If you do not hear from us, do not assume that everything is normal. Please call [phone] to inquire about your results.   - Repeat colonoscopy in 2 years  - Follow-up with your primary care provider

## 2024-08-23 NOTE — ANESTHESIA POSTPROCEDURE EVALUATION
Department of Anesthesiology  Postprocedure Note    Patient: Vadim Sotomayor Jr.  MRN: [id number]  YOB: 1945  Date of evaluation: 8/23/2024    Procedure Summary       Date: 08/23/24 Room / Location: [facility] 01 / [facility] ENDOSCOPY    Anesthesia Start: 0937 Anesthesia Stop: 1012    Procedures:       COLONOSCOPY BIOPSY, ESOPHAGOGASTRODUODENOSCOPY BIOPSY      , Diagnosis:       Acute ulcerative colitis with complication (HCC)      Hepatic cirrhosis, unspecified hepatic cirrhosis type, unspecified whether ascites present (HCC)    Surgeons: Ximena Mcgregor DO Responsible Provider: Toan Martinez MD    Anesthesia Type: MAC ASA Status: 3            Anesthesia Type: MAC    Opal Phase I:      Opal Phase II: Opal Score: 10    Anesthesia Post Evaluation    Patient location during evaluation: PACU  Patient participation: complete - patient participated  Level of consciousness: awake and alert  Airway patency: patent  Nausea & Vomiting: no nausea  Cardiovascular status: hemodynamically stable  Respiratory status: acceptable  Hydration status: euvolemic    No notable events documented.

## 2024-08-23 NOTE — H&P
[facility]  BRIDGETT Pinoal Balbir MOLINA  [phone]          Pre-procedure History and Physical       NAME:  Vadim Sotomayor Jr.   :   1945   MRN:   [id number]     CHIEF COMPLAINT/HPI:     78 y.o. male here for EGD & colonoscopy for cirrhosis & ulcerative respectively.    PMH:  Past Medical History:   Diagnosis Date    CAD (coronary artery disease)     Cancer (HCC)     under rt eye and rt ear skin cancer    Cerebral artery occlusion with cerebral infarction (HCC) 2023    \"mini stroke\"    Diabetes mellitus (HCC)     type 2    Diarrhea     GERD (gastroesophageal reflux disease)     Hyperlipidemia     SAÚL (obstructive sleep apnea)     doesnt use CPAP    Ulcerative colitis (HCC)        PSH:  Past Surgical History:   Procedure Laterality Date    COLONOSCOPY      CORONARY ARTERY BYPASS GRAFT      3 vessels    HEMORRHOID SURGERY      TOTAL HIP ARTHROPLASTY Right     UPPER GASTROINTESTINAL ENDOSCOPY N/A 2024    ESOPHAGOGASTRODUODENOSCOPY performed by Moe Rios MD at [facility] ENDOSCOPY       Allergies:  No Known Allergies    Home Medications:  Prior to Admission Medications   Prescriptions Last Dose Informant Patient Reported? Taking?   FLUoxetine (PROZAC) 40 MG capsule 2024  Yes Yes   Sig: Take 2 capsules by mouth daily   Multiple Vitamins-Minerals (THERAPEUTIC MULTIVITAMIN-MINERALS) tablet 2024  Yes Yes   Sig: Take 1 tablet by mouth daily   hydrOXYzine HCl (ATARAX) 10 MG tablet 2024  Yes Yes   Sig: Take 1 tablet by mouth 2 times daily as needed for Anxiety   loperamide (IMODIUM) 2 MG capsule Past Week  Yes Yes   Sig: Take 1 capsule by mouth 4 times daily as needed for Diarrhea   mercaptopurine (PURINETHOL) 50 MG chemo tablet 2024  Yes Yes   Sig: Take 0.5 tablets by mouth daily   midodrine (PROAMATINE) 10 MG tablet 2024  No Yes   Sig: Take 1.5 tablets by mouth 3 times daily   nadolol (CORGARD) 20 MG tablet 2024  Yes Yes   Sig: Take 1 tablet by  [id number])  Colonoscopy +/- biopsy if needed (43090). Colonoscopy +/- polypectomy if needed (94542).     Signed By: BRIDGETT Mcgregor D.O.  [facility]      8/23/2024  9:28 AM

## 2024-08-23 NOTE — PROGRESS NOTES
ARRIVAL INFORMATION:  Verified patient name and date of birth, scheduled procedure, and informed consent.     : Arnel (friend) contact number: [phone]  Physician and staff cannot share information with the .     Belongings with patient include:  Clothing,Glasses,watch,necklace    GI FOCUSED ASSESSMENT:  Neuro: Awake, alert, oriented x4  Respiratory: even and unlabored   GI: soft and non-distended    Education:Reviewed general discharge instructions and  information.     The risks and benefits of the bite block have been explained to patient.  Patient verbalizes understanding.

## 2024-08-23 NOTE — DISCHARGE SUMMARY
[facility]  BRIDGETT Mcgregor D.O.  [phone]            EGD & COLONOSCOPY DISCHARGE INSTRUCTIONS    Vadim Sotomayor Jr.  [id number]  1945    DISCOMFORT:  Redness at IV site- apply warm compress to area; if redness or soreness persist- contact your physician  There may be a slight amount of blood passed from the rectum  Gaseous discomfort- walking, belching will help relieve any discomfort  You may not operate a vehicle for 12 hours  You may not engage in an occupation involving machinery or appliances for the  rest of today  You may not drink alcoholic beverages for at least 12 hours  Avoid making any critical decisions for at least 24 hours    DIET:   You may resume your normal diet, but some patients find that heavy or large meals may lead to indigestion or vomiting. I suggest a light meal as first food intake.    ACTIVITY:  You may resume your normal daily activities.  It is recommended that you spend the remainder of the day resting - avoid any strenuous activity.    CALL M.DKrzysztof IF ANY SIGN OF:   Increasing pain, nausea, vomiting  Abdominal distension (swelling)  Significant bleeding (oral or rectal)  Fever   Pain in chest area  Shortness of breath    Additional Instructions:   Call Dr. Mcgregor if any questions or problems at [phone]   You should receive the biopsy results by phone or mail within 3 weeks, if not, call  my office for the results      Colonoscopy showed mild colitis. One small polyp that was removed.    EGD showed portal hypertensive gastropathy, GAVE & esophageal varices.    Should have a repeat colonoscopy in 2 years.    Resume regular medications    Follow-up at your convenience.      BRIDGETT Mcgregor D.O.  [facility]

## 2024-08-23 NOTE — PERIOP NOTE
See anesthesia note and MAR for medications given during procedure.   Endoscope was pre-cleaned at the bedside immediately following procedure by KEV Pimentel

## 2024-08-23 NOTE — DISCHARGE INSTRUCTIONS
[facility]  BRIDGETT Mcgregor D.O.  [phone]            EGD & COLONOSCOPY DISCHARGE INSTRUCTIONS    Vadim Sotomayor Jr.  [id number]  1945    DISCOMFORT:  Redness at IV site- apply warm compress to area; if redness or soreness persist- contact your physician  There may be a slight amount of blood passed from the rectum  Gaseous discomfort- walking, belching will help relieve any discomfort  You may not operate a vehicle for 12 hours  You may not engage in an occupation involving machinery or appliances for the  rest of today  You may not drink alcoholic beverages for at least 12 hours  Avoid making any critical decisions for at least 24 hours    DIET:   You may resume your normal diet, but some patients find that heavy or large meals may lead to indigestion or vomiting. I suggest a light meal as first food intake.    ACTIVITY:  You may resume your normal daily activities.  It is recommended that you spend the remainder of the day resting - avoid any strenuous activity.    CALL M.D. IF ANY SIGN OF:   Increasing pain, nausea, vomiting  Abdominal distension (swelling)  Significant bleeding (oral or rectal)  Fever   Pain in chest area  Shortness of breath    Additional Instructions:   Call Dr. Mcgregor if any questions or problems at [phone]   You should receive the biopsy results by phone or mail within 3 weeks, if not, call  my office for the results      Colonoscopy showed mild colitis. One small polyp that was removed.    EGD showed portal hypertensive gastropathy, GAVE & esophageal varices.    Should have a repeat colonoscopy in 2 years.    Resume regular medications    Follow-up at your convenience.      BRIDGETT Mcgregor D.O.  [facility]    Patient Education on Sedation / Analgesia Administered for Procedure      For 24 hours after general anesthesia or intravenous analgesia / sedation:  Have someone responsible help you with your

## 2024-08-23 NOTE — ANESTHESIA PRE PROCEDURE
ROS comment: Syncope and collapse GI/Hepatic/Renal:   (+) GERD:, PUD         ROS comment: Ulcerative colitis.   Endo/Other:    (+) DiabetesType II DM, poorly controlled, blood dyscrasia: anemia:..                 Abdominal:             Vascular: negative vascular ROS.         Other Findings:           Anesthesia Plan      MAC     ASA 3       Induction: intravenous.      Anesthetic plan and risks discussed with patient.    Use of blood products discussed with patient whom.    Plan discussed with CRNA.    Attending anesthesiologist reviewed and agrees with Preprocedure content              Toan Martinez MD   8/23/2024

## 2024-10-18 ENCOUNTER — HOSPITAL ENCOUNTER (OUTPATIENT)
Facility: HOSPITAL | Age: 79
Discharge: HOME | End: 2024-10-21
Attending: STUDENT IN AN ORGANIZED HEALTH CARE EDUCATION/TRAINING PROGRAM
Payer: MEDICARE

## 2024-10-18 VITALS
HEART RATE: 77 BPM | DIASTOLIC BLOOD PRESSURE: 75 MMHG | OXYGEN SATURATION: 98 % | TEMPERATURE: 98.5 F | SYSTOLIC BLOOD PRESSURE: 106 MMHG | RESPIRATION RATE: 20 BRPM

## 2024-10-18 DIAGNOSIS — R18.8 ASCITES OF LIVER: ICD-10-CM

## 2024-10-18 LAB
COMMENT:: NORMAL
SPECIMEN HOLD: NORMAL

## 2024-10-18 PROCEDURE — 49083 ABD PARACENTESIS W/IMAGING: CPT

## 2024-10-18 ASSESSMENT — PAIN - FUNCTIONAL ASSESSMENT: PAIN_FUNCTIONAL_ASSESSMENT: NONE - DENIES PAIN

## 2024-10-18 NOTE — DISCHARGE INSTRUCTIONS
[facility]  Department of Interventional Radiology  [address] [place] [address]  [phone]    PARACENTESIS DISCHARGE EDUCATION      Angela Brennan     Date:   10/18/2024      Information:   Paracentesis is a procedure to remove extra fluid from your belly (abdomen). This fluid buildup in the abdomen is called ascites. The procedure may have been done to take a sample of the fluid. Or, it may have been done to drain the extra fluid from your abdomen and help make you more comfortable.      What should I expect after the Paracentesis?    Expect some slight soreness at the site where the catheter was inserted. To relieve pain, take Tylenol (acetaminophen) or the pain medicine your doctor prescribed. Avoid ibuprofen (Advil, Motrin) and aspirin as they may cause you to bleed.   You will have a small bandage over the puncture site. Stitches, surgical staples, adhesive tapes, adhesive strips, or surgical glue may be used to close the cut (incision). They also help stop bleeding and speed healing. You may take the bandage off in 24 hours.   Do not lift anything greater than 5 pounds for 24 hours   It is Normal to experience slight bleeding or drainage after the procedure.      Bathing & Wound Care:    Remove the small bandages on your incision after 24 to 48 hours or as directed by your doctor.    You may shower after 24 hours; do not submerge in water for a minimum of 3 days (bath tub, hot tub, swimming pool, river or any other body of water).     Follow-up visit information:   Follow up with Interventional Radiology is not routinely necessary.     Occasionally, a situation will require prompt attention and to call your Primary Care Physician:    Swelling    Excessive Redness    Continued/ Excessive Drainage    Bright Red Continuous Bleeding    Increased Prolonged Pain    Fever of 100.4° or greater      If you have any questions or concerns, please call [phone] and

## 2024-10-18 NOTE — PROGRESS NOTES
Name of procedure: Paracentesis    Vital Signs:Stable    Fluids removed:2250 ml clear yellow    Samples sent to lab:hold    Any complications related to procedure: none  .

## 2024-12-11 ENCOUNTER — TRANSCRIBE ORDERS (OUTPATIENT)
Facility: HOSPITAL | Age: 79
End: 2024-12-11

## 2024-12-11 DIAGNOSIS — R18.8 ASCITES OF LIVER: Primary | ICD-10-CM

## 2024-12-11 DIAGNOSIS — D64.9 ANEMIA, UNSPECIFIED TYPE: ICD-10-CM

## 2024-12-11 DIAGNOSIS — K74.60 HEPATIC CIRRHOSIS, UNSPECIFIED HEPATIC CIRRHOSIS TYPE, UNSPECIFIED WHETHER ASCITES PRESENT (HCC): ICD-10-CM

## 2025-01-02 ENCOUNTER — HOSPITAL ENCOUNTER (OUTPATIENT)
Facility: HOSPITAL | Age: 80
Discharge: HOME | End: 2025-01-02
Attending: STUDENT IN AN ORGANIZED HEALTH CARE EDUCATION/TRAINING PROGRAM
Payer: MEDICARE

## 2025-01-02 VITALS
WEIGHT: 173 LBS | HEIGHT: 70 IN | BODY MASS INDEX: 24.77 KG/M2 | DIASTOLIC BLOOD PRESSURE: 66 MMHG | OXYGEN SATURATION: 99 % | HEART RATE: 71 BPM | SYSTOLIC BLOOD PRESSURE: 143 MMHG | RESPIRATION RATE: 16 BRPM

## 2025-01-02 DIAGNOSIS — R18.8 ASCITES OF LIVER: ICD-10-CM

## 2025-01-02 DIAGNOSIS — D64.9 ANEMIA, UNSPECIFIED TYPE: ICD-10-CM

## 2025-01-02 DIAGNOSIS — K74.60 HEPATIC CIRRHOSIS, UNSPECIFIED HEPATIC CIRRHOSIS TYPE, UNSPECIFIED WHETHER ASCITES PRESENT (HCC): ICD-10-CM

## 2025-01-02 PROCEDURE — 76705 ECHO EXAM OF ABDOMEN: CPT

## 2025-01-02 ASSESSMENT — PAIN - FUNCTIONAL ASSESSMENT: PAIN_FUNCTIONAL_ASSESSMENT: NONE - DENIES PAIN

## 2025-01-02 NOTE — PROGRESS NOTES
0745: Patient arrived ambulatory to [facility] Recovery area. Patient is A&Ox4 on RA in NAD at this time. Code status and allergies verified with patient prior to procedure.     0815: Per MD Rose, patient does not have enough fluid to safely drain for paracentesis. Patient escorted back to main lobby for discharge.

## 2025-01-20 ENCOUNTER — TELEPHONE (OUTPATIENT)
Age: 80
End: 2025-01-20

## 2025-01-20 NOTE — TELEPHONE ENCOUNTER
Spoke with Dr Mcgregor's office and they are going to send over office notes.   Tried to call patient again with no answer

## 2025-01-20 NOTE — TELEPHONE ENCOUNTER
Left message for patient to call me back so I can obtain more info about his appt.  Sent a request for records to Dr Mcgregor's office on Friday at 9am

## (undated) DEVICE — ELECTRODE PT RET AD L9FT HI MOIST COND ADH HYDRGEL CORDED

## (undated) DEVICE — SET GRAV CK VLV NEEDLESS ST 3 GANGED 4WAY STPCOCK HI FLO 10

## (undated) DEVICE — CATHETER IV 18GA L1.16IN OD1.27-1.3462MM ID0.9398-1.016MM

## (undated) DEVICE — SYSTEM REPROC CBL 3 LD DISPOSABLE

## (undated) DEVICE — CATHETER IV 24GA L0.75IN OD0.6604-0.7366MM

## (undated) DEVICE — CUFF BLD PRSS AD CLTH SGL TB W/ BAYNT CONN ROUNDED CORNER

## (undated) DEVICE — TRAP ENDOSCP POLYP 2 CHMBR DRAWER TYP

## (undated) DEVICE — SNARE ENDOSCP POLYP MED STD AD 2.4X27X240 CM 2.8 MM OVL SENS

## (undated) DEVICE — INJECTION THERAPY NEEDLE CATHETER: Brand: INTERJECT

## (undated) DEVICE — CONTAINER SPEC 20 ML LID NEUT BUFF FORMALIN 10 % POLYPR STS

## (undated) DEVICE — ENDOSCOPIC KIT COMPLIANCE ENDOKIT

## (undated) DEVICE — FIAPC® PROBE W/ FILTER 2200 A OD 2.3MM/6.9FR; L 2.2M/7.2FT: Brand: ERBE

## (undated) DEVICE — IV START KIT: Brand: MEDLINE

## (undated) DEVICE — CATHETER IV 20GA L1.16IN OD1.0414-1.1176MM ID0.762-0.8382MM

## (undated) DEVICE — COVIDIEN KENDALL DL DISPOSABLE 3 LEAD SY: Brand: MEDLINE RENEWAL

## (undated) DEVICE — FIAPC® PROBE W/ FILTER 2200 C OD 2.3MM/6.9FR; L 2.2M/7.2FT: Brand: ERBE

## (undated) DEVICE — FORCEPS BX L240CM JAW DIA2.4MM ORNG L CAP W/ NDL DISP RAD

## (undated) DEVICE — CATHETER IV 22GA L1IN OD0.8382-0.9144MM ID0.6096-0.6858MM 382523

## (undated) DEVICE — TIP SUCT TRNSPAR RIB SURF STD BLB RIG NVENT W/ 5IN1 CONN DYND50138] MEDLINE INDUSTRIES INC]

## (undated) DEVICE — FORCEP BX LG CAP 2.4 MMX120 CM W/ NDL YEL RADIAL JAW 4 DISP